# Patient Record
Sex: MALE | Race: WHITE | Employment: OTHER | URBAN - NONMETROPOLITAN AREA
[De-identification: names, ages, dates, MRNs, and addresses within clinical notes are randomized per-mention and may not be internally consistent; named-entity substitution may affect disease eponyms.]

---

## 2023-03-12 ENCOUNTER — APPOINTMENT (OUTPATIENT)
Dept: GENERAL RADIOLOGY | Age: 73
End: 2023-03-12
Payer: MEDICARE

## 2023-03-12 ENCOUNTER — APPOINTMENT (OUTPATIENT)
Dept: CT IMAGING | Age: 73
End: 2023-03-12
Payer: MEDICARE

## 2023-03-12 ENCOUNTER — HOSPITAL ENCOUNTER (INPATIENT)
Age: 73
LOS: 5 days | Discharge: HOME | End: 2023-03-17
Attending: EMERGENCY MEDICINE | Admitting: ORTHOPAEDIC SURGERY
Payer: MEDICARE

## 2023-03-12 ENCOUNTER — ANESTHESIA (OUTPATIENT)
Dept: OPERATING ROOM | Age: 73
End: 2023-03-12
Payer: MEDICARE

## 2023-03-12 ENCOUNTER — ANESTHESIA EVENT (OUTPATIENT)
Dept: OPERATING ROOM | Age: 73
End: 2023-03-12
Payer: MEDICARE

## 2023-03-12 DIAGNOSIS — F10.929 ACUTE ALCOHOLIC INTOXICATION WITH COMPLICATION (HCC): ICD-10-CM

## 2023-03-12 DIAGNOSIS — S42.91XA CLOSED FRACTURE DISLOCATION OF RIGHT SHOULDER, INITIAL ENCOUNTER: Primary | ICD-10-CM

## 2023-03-12 LAB
ABO: NORMAL
ALBUMIN SERPL BCG-MCNC: 4.3 G/DL (ref 3.5–5.1)
ALP SERPL-CCNC: 85 U/L (ref 38–126)
ALT SERPL W/O P-5'-P-CCNC: 44 U/L (ref 11–66)
ANION GAP SERPL CALC-SCNC: 12 MEQ/L (ref 8–16)
ANTIBODY SCREEN: NORMAL
AST SERPL-CCNC: 40 U/L (ref 5–40)
BASOPHILS ABSOLUTE: 0 THOU/MM3 (ref 0–0.1)
BASOPHILS NFR BLD AUTO: 0.2 %
BILIRUB SERPL-MCNC: 0.3 MG/DL (ref 0.3–1.2)
BUN SERPL-MCNC: 17 MG/DL (ref 7–22)
CALCIUM SERPL-MCNC: 9.1 MG/DL (ref 8.5–10.5)
CHLORIDE SERPL-SCNC: 106 MEQ/L (ref 98–111)
CO2 SERPL-SCNC: 21 MEQ/L (ref 23–33)
CREAT SERPL-MCNC: 0.8 MG/DL (ref 0.4–1.2)
DEPRECATED RDW RBC AUTO: 44.5 FL (ref 35–45)
EKG ATRIAL RATE: 98 BPM
EKG P AXIS: 80 DEGREES
EKG P-R INTERVAL: 174 MS
EKG Q-T INTERVAL: 350 MS
EKG QRS DURATION: 84 MS
EKG QTC CALCULATION (BAZETT): 446 MS
EKG R AXIS: 23 DEGREES
EKG T AXIS: 69 DEGREES
EKG VENTRICULAR RATE: 98 BPM
EOSINOPHIL NFR BLD AUTO: 0.1 %
EOSINOPHILS ABSOLUTE: 0 THOU/MM3 (ref 0–0.4)
ERYTHROCYTE [DISTWIDTH] IN BLOOD BY AUTOMATED COUNT: 13.2 % (ref 11.5–14.5)
ETHANOL SERPL-MCNC: 0.12 %
GFR SERPL CREATININE-BSD FRML MDRD: > 60 ML/MIN/1.73M2
GLUCOSE SERPL-MCNC: 125 MG/DL (ref 70–108)
HCT VFR BLD AUTO: 47 % (ref 42–52)
HGB BLD-MCNC: 15.8 GM/DL (ref 14–18)
IMM GRANULOCYTES # BLD AUTO: 0.1 THOU/MM3 (ref 0–0.07)
IMM GRANULOCYTES NFR BLD AUTO: 0.8 %
LYMPHOCYTES ABSOLUTE: 1 THOU/MM3 (ref 1–4.8)
LYMPHOCYTES NFR BLD AUTO: 8.2 %
MCH RBC QN AUTO: 31.2 PG (ref 26–33)
MCHC RBC AUTO-ENTMCNC: 33.6 GM/DL (ref 32.2–35.5)
MCV RBC AUTO: 92.9 FL (ref 80–94)
MONOCYTES ABSOLUTE: 0.7 THOU/MM3 (ref 0.4–1.3)
MONOCYTES NFR BLD AUTO: 5.4 %
NEUTROPHILS NFR BLD AUTO: 85.3 %
NRBC BLD AUTO-RTO: 0 /100 WBC
OSMOLALITY SERPL CALC.SUM OF ELEC: 280.6 MOSMOL/KG (ref 275–300)
PLATELET # BLD AUTO: 219 THOU/MM3 (ref 130–400)
PMV BLD AUTO: 8.5 FL (ref 9.4–12.4)
POTASSIUM SERPL-SCNC: 4.7 MEQ/L (ref 3.5–5.2)
PROT SERPL-MCNC: 7 G/DL (ref 6.1–8)
RBC # BLD AUTO: 5.06 MILL/MM3 (ref 4.7–6.1)
RH FACTOR: NORMAL
SEGMENTED NEUTROPHILS ABSOLUTE COUNT: 10.4 THOU/MM3 (ref 1.8–7.7)
SODIUM SERPL-SCNC: 139 MEQ/L (ref 135–145)
WBC # BLD AUTO: 12.2 THOU/MM3 (ref 4.8–10.8)

## 2023-03-12 PROCEDURE — 73130 X-RAY EXAM OF HAND: CPT

## 2023-03-12 PROCEDURE — 93010 ELECTROCARDIOGRAM REPORT: CPT | Performed by: INTERNAL MEDICINE

## 2023-03-12 PROCEDURE — 70450 CT HEAD/BRAIN W/O DYE: CPT

## 2023-03-12 PROCEDURE — 73200 CT UPPER EXTREMITY W/O DYE: CPT

## 2023-03-12 PROCEDURE — 99285 EMERGENCY DEPT VISIT HI MDM: CPT

## 2023-03-12 PROCEDURE — 82077 ASSAY SPEC XCP UR&BREATH IA: CPT

## 2023-03-12 PROCEDURE — 73020 X-RAY EXAM OF SHOULDER: CPT

## 2023-03-12 PROCEDURE — 6360000002 HC RX W HCPCS: Performed by: STUDENT IN AN ORGANIZED HEALTH CARE EDUCATION/TRAINING PROGRAM

## 2023-03-12 PROCEDURE — 2580000003 HC RX 258: Performed by: ANESTHESIOLOGY

## 2023-03-12 PROCEDURE — 99222 1ST HOSP IP/OBS MODERATE 55: CPT | Performed by: STUDENT IN AN ORGANIZED HEALTH CARE EDUCATION/TRAINING PROGRAM

## 2023-03-12 PROCEDURE — 36415 COLL VENOUS BLD VENIPUNCTURE: CPT

## 2023-03-12 PROCEDURE — 96376 TX/PRO/DX INJ SAME DRUG ADON: CPT

## 2023-03-12 PROCEDURE — 93005 ELECTROCARDIOGRAM TRACING: CPT | Performed by: STUDENT IN AN ORGANIZED HEALTH CARE EDUCATION/TRAINING PROGRAM

## 2023-03-12 PROCEDURE — 2500000003 HC RX 250 WO HCPCS

## 2023-03-12 PROCEDURE — 86900 BLOOD TYPING SEROLOGIC ABO: CPT

## 2023-03-12 PROCEDURE — 3600000002 HC SURGERY LEVEL 2 BASE: Performed by: ORTHOPAEDIC SURGERY

## 2023-03-12 PROCEDURE — 72125 CT NECK SPINE W/O DYE: CPT

## 2023-03-12 PROCEDURE — 96374 THER/PROPH/DIAG INJ IV PUSH: CPT

## 2023-03-12 PROCEDURE — 73030 X-RAY EXAM OF SHOULDER: CPT

## 2023-03-12 PROCEDURE — 94761 N-INVAS EAR/PLS OXIMETRY MLT: CPT

## 2023-03-12 PROCEDURE — 3600000012 HC SURGERY LEVEL 2 ADDTL 15MIN: Performed by: ORTHOPAEDIC SURGERY

## 2023-03-12 PROCEDURE — 6360000002 HC RX W HCPCS: Performed by: ANESTHESIOLOGY

## 2023-03-12 PROCEDURE — 2700000000 HC OXYGEN THERAPY PER DAY

## 2023-03-12 PROCEDURE — 73060 X-RAY EXAM OF HUMERUS: CPT

## 2023-03-12 PROCEDURE — 86901 BLOOD TYPING SEROLOGIC RH(D): CPT

## 2023-03-12 PROCEDURE — 6360000002 HC RX W HCPCS: Performed by: EMERGENCY MEDICINE

## 2023-03-12 PROCEDURE — 2500000003 HC RX 250 WO HCPCS: Performed by: NURSE ANESTHETIST, CERTIFIED REGISTERED

## 2023-03-12 PROCEDURE — 0PSCXZZ REPOSITION RIGHT HUMERAL HEAD, EXTERNAL APPROACH: ICD-10-PCS | Performed by: ORTHOPAEDIC SURGERY

## 2023-03-12 PROCEDURE — 3700000001 HC ADD 15 MINUTES (ANESTHESIA): Performed by: ORTHOPAEDIC SURGERY

## 2023-03-12 PROCEDURE — 1200000000 HC SEMI PRIVATE

## 2023-03-12 PROCEDURE — 6820000001 HC L2 TRAUMA SURGERY EVALUATION: Performed by: ORTHOPAEDIC SURGERY

## 2023-03-12 PROCEDURE — 85025 COMPLETE CBC W/AUTO DIFF WBC: CPT

## 2023-03-12 PROCEDURE — 3700000000 HC ANESTHESIA ATTENDED CARE: Performed by: ORTHOPAEDIC SURGERY

## 2023-03-12 PROCEDURE — 2580000003 HC RX 258: Performed by: EMERGENCY MEDICINE

## 2023-03-12 PROCEDURE — 80053 COMPREHEN METABOLIC PANEL: CPT

## 2023-03-12 PROCEDURE — 2580000003 HC RX 258: Performed by: PHYSICIAN ASSISTANT

## 2023-03-12 PROCEDURE — 6360000002 HC RX W HCPCS: Performed by: PHYSICIAN ASSISTANT

## 2023-03-12 PROCEDURE — 6370000000 HC RX 637 (ALT 250 FOR IP): Performed by: PHYSICIAN ASSISTANT

## 2023-03-12 PROCEDURE — 73070 X-RAY EXAM OF ELBOW: CPT

## 2023-03-12 PROCEDURE — 2709999900 HC NON-CHARGEABLE SUPPLY: Performed by: ORTHOPAEDIC SURGERY

## 2023-03-12 PROCEDURE — 86850 RBC ANTIBODY SCREEN: CPT

## 2023-03-12 RX ORDER — KETAMINE HCL IN NACL, ISO-OSM 100MG/10ML
1 SYRINGE (ML) INJECTION ONCE
Status: COMPLETED | OUTPATIENT
Start: 2023-03-12 | End: 2023-03-12

## 2023-03-12 RX ORDER — ONDANSETRON 2 MG/ML
4 INJECTION INTRAMUSCULAR; INTRAVENOUS EVERY 6 HOURS PRN
Status: DISCONTINUED | OUTPATIENT
Start: 2023-03-12 | End: 2023-03-17 | Stop reason: HOSPADM

## 2023-03-12 RX ORDER — HYDROCODONE BITARTRATE AND ACETAMINOPHEN 5; 325 MG/1; MG/1
1 TABLET ORAL EVERY 4 HOURS PRN
Status: DISCONTINUED | OUTPATIENT
Start: 2023-03-12 | End: 2023-03-17 | Stop reason: HOSPADM

## 2023-03-12 RX ORDER — CYCLOBENZAPRINE HCL 10 MG
10 TABLET ORAL 3 TIMES DAILY PRN
Status: DISCONTINUED | OUTPATIENT
Start: 2023-03-12 | End: 2023-03-17 | Stop reason: HOSPADM

## 2023-03-12 RX ORDER — HYDRALAZINE HYDROCHLORIDE 20 MG/ML
10 INJECTION INTRAMUSCULAR; INTRAVENOUS
Status: DISCONTINUED | OUTPATIENT
Start: 2023-03-12 | End: 2023-03-12 | Stop reason: HOSPADM

## 2023-03-12 RX ORDER — FENTANYL CITRATE 50 UG/ML
100 INJECTION, SOLUTION INTRAMUSCULAR; INTRAVENOUS ONCE
Status: COMPLETED | OUTPATIENT
Start: 2023-03-12 | End: 2023-03-12

## 2023-03-12 RX ORDER — KETAMINE HCL IN NACL, ISO-OSM 100MG/10ML
SYRINGE (ML) INJECTION
Status: COMPLETED
Start: 2023-03-12 | End: 2023-03-12

## 2023-03-12 RX ORDER — SODIUM CHLORIDE 9 MG/ML
INJECTION, SOLUTION INTRAVENOUS PRN
Status: DISCONTINUED | OUTPATIENT
Start: 2023-03-12 | End: 2023-03-12 | Stop reason: SDUPTHER

## 2023-03-12 RX ORDER — SODIUM CHLORIDE 0.9 % (FLUSH) 0.9 %
5-40 SYRINGE (ML) INJECTION PRN
Status: DISCONTINUED | OUTPATIENT
Start: 2023-03-12 | End: 2023-03-17 | Stop reason: HOSPADM

## 2023-03-12 RX ORDER — LIDOCAINE HYDROCHLORIDE 20 MG/ML
INJECTION, SOLUTION INFILTRATION; PERINEURAL PRN
Status: DISCONTINUED | OUTPATIENT
Start: 2023-03-12 | End: 2023-03-12 | Stop reason: SDUPTHER

## 2023-03-12 RX ORDER — SODIUM CHLORIDE 0.9 % (FLUSH) 0.9 %
5-40 SYRINGE (ML) INJECTION PRN
Status: DISCONTINUED | OUTPATIENT
Start: 2023-03-12 | End: 2023-03-12 | Stop reason: SDUPTHER

## 2023-03-12 RX ORDER — HYDROCODONE BITARTRATE AND ACETAMINOPHEN 5; 325 MG/1; MG/1
1 TABLET ORAL EVERY 6 HOURS PRN
Qty: 28 TABLET | Refills: 0 | Status: SHIPPED | OUTPATIENT
Start: 2023-03-12 | End: 2023-03-19

## 2023-03-12 RX ORDER — SODIUM CHLORIDE, SODIUM LACTATE, POTASSIUM CHLORIDE, AND CALCIUM CHLORIDE .6; .31; .03; .02 G/100ML; G/100ML; G/100ML; G/100ML
1000 INJECTION, SOLUTION INTRAVENOUS ONCE
Status: COMPLETED | OUTPATIENT
Start: 2023-03-12 | End: 2023-03-12

## 2023-03-12 RX ORDER — FENTANYL CITRATE 50 UG/ML
50 INJECTION, SOLUTION INTRAMUSCULAR; INTRAVENOUS EVERY 5 MIN PRN
Status: DISCONTINUED | OUTPATIENT
Start: 2023-03-12 | End: 2023-03-12 | Stop reason: HOSPADM

## 2023-03-12 RX ORDER — MORPHINE SULFATE 4 MG/ML
4 INJECTION, SOLUTION INTRAMUSCULAR; INTRAVENOUS
Status: DISCONTINUED | OUTPATIENT
Start: 2023-03-12 | End: 2023-03-17 | Stop reason: HOSPADM

## 2023-03-12 RX ORDER — ONDANSETRON 4 MG/1
4 TABLET, ORALLY DISINTEGRATING ORAL EVERY 8 HOURS PRN
Status: DISCONTINUED | OUTPATIENT
Start: 2023-03-12 | End: 2023-03-17 | Stop reason: HOSPADM

## 2023-03-12 RX ORDER — ACETAMINOPHEN 325 MG/1
650 TABLET ORAL EVERY 4 HOURS PRN
Status: DISCONTINUED | OUTPATIENT
Start: 2023-03-12 | End: 2023-03-17 | Stop reason: HOSPADM

## 2023-03-12 RX ORDER — FENTANYL CITRATE 50 UG/ML
50 INJECTION, SOLUTION INTRAMUSCULAR; INTRAVENOUS ONCE
Status: COMPLETED | OUTPATIENT
Start: 2023-03-12 | End: 2023-03-12

## 2023-03-12 RX ORDER — LABETALOL HYDROCHLORIDE 5 MG/ML
10 INJECTION, SOLUTION INTRAVENOUS
Status: DISCONTINUED | OUTPATIENT
Start: 2023-03-12 | End: 2023-03-12 | Stop reason: HOSPADM

## 2023-03-12 RX ORDER — FENTANYL CITRATE 50 UG/ML
INJECTION, SOLUTION INTRAMUSCULAR; INTRAVENOUS PRN
Status: DISCONTINUED | OUTPATIENT
Start: 2023-03-12 | End: 2023-03-12 | Stop reason: SDUPTHER

## 2023-03-12 RX ORDER — SODIUM CHLORIDE 0.9 % (FLUSH) 0.9 %
5-40 SYRINGE (ML) INJECTION EVERY 12 HOURS SCHEDULED
Status: DISCONTINUED | OUTPATIENT
Start: 2023-03-12 | End: 2023-03-17 | Stop reason: HOSPADM

## 2023-03-12 RX ORDER — MORPHINE SULFATE 2 MG/ML
2 INJECTION, SOLUTION INTRAMUSCULAR; INTRAVENOUS
Status: DISCONTINUED | OUTPATIENT
Start: 2023-03-12 | End: 2023-03-17 | Stop reason: HOSPADM

## 2023-03-12 RX ORDER — SODIUM CHLORIDE 0.9 % (FLUSH) 0.9 %
5-40 SYRINGE (ML) INJECTION EVERY 12 HOURS SCHEDULED
Status: DISCONTINUED | OUTPATIENT
Start: 2023-03-12 | End: 2023-03-12 | Stop reason: SDUPTHER

## 2023-03-12 RX ORDER — PROPOFOL 10 MG/ML
INJECTION, EMULSION INTRAVENOUS PRN
Status: DISCONTINUED | OUTPATIENT
Start: 2023-03-12 | End: 2023-03-12 | Stop reason: SDUPTHER

## 2023-03-12 RX ORDER — SODIUM CHLORIDE 9 MG/ML
INJECTION, SOLUTION INTRAVENOUS PRN
Status: DISCONTINUED | OUTPATIENT
Start: 2023-03-12 | End: 2023-03-17 | Stop reason: HOSPADM

## 2023-03-12 RX ORDER — HYDROCODONE BITARTRATE AND ACETAMINOPHEN 5; 325 MG/1; MG/1
2 TABLET ORAL EVERY 4 HOURS PRN
Status: DISCONTINUED | OUTPATIENT
Start: 2023-03-12 | End: 2023-03-17 | Stop reason: HOSPADM

## 2023-03-12 RX ORDER — SODIUM CHLORIDE, SODIUM LACTATE, POTASSIUM CHLORIDE, CALCIUM CHLORIDE 600; 310; 30; 20 MG/100ML; MG/100ML; MG/100ML; MG/100ML
INJECTION, SOLUTION INTRAVENOUS CONTINUOUS PRN
Status: DISCONTINUED | OUTPATIENT
Start: 2023-03-12 | End: 2023-03-12 | Stop reason: SDUPTHER

## 2023-03-12 RX ADMIN — FENTANYL CITRATE 50 MCG: 50 INJECTION, SOLUTION INTRAMUSCULAR; INTRAVENOUS at 08:02

## 2023-03-12 RX ADMIN — HYDROMORPHONE HYDROCHLORIDE 1 MG: 1 INJECTION, SOLUTION INTRAMUSCULAR; INTRAVENOUS; SUBCUTANEOUS at 12:36

## 2023-03-12 RX ADMIN — HYDROCODONE BITARTRATE AND ACETAMINOPHEN 2 TABLET: 5; 325 TABLET ORAL at 21:57

## 2023-03-12 RX ADMIN — FENTANYL CITRATE 100 MCG: 50 INJECTION, SOLUTION INTRAMUSCULAR; INTRAVENOUS at 10:46

## 2023-03-12 RX ADMIN — FENTANYL CITRATE 50 MCG: 50 INJECTION, SOLUTION INTRAMUSCULAR; INTRAVENOUS at 19:53

## 2023-03-12 RX ADMIN — PROPOFOL 250 MG: 10 INJECTION, EMULSION INTRAVENOUS at 19:53

## 2023-03-12 RX ADMIN — SODIUM CHLORIDE, POTASSIUM CHLORIDE, SODIUM LACTATE AND CALCIUM CHLORIDE 1000 ML: 600; 310; 30; 20 INJECTION, SOLUTION INTRAVENOUS at 08:44

## 2023-03-12 RX ADMIN — Medication 100 MG: at 10:19

## 2023-03-12 RX ADMIN — LIDOCAINE HYDROCHLORIDE 100 MG: 20 INJECTION, SOLUTION INFILTRATION; PERINEURAL at 19:53

## 2023-03-12 RX ADMIN — Medication 50 MG: at 10:24

## 2023-03-12 RX ADMIN — MORPHINE SULFATE 4 MG: 4 INJECTION, SOLUTION INTRAMUSCULAR; INTRAVENOUS at 16:06

## 2023-03-12 RX ADMIN — MORPHINE SULFATE 4 MG: 4 INJECTION, SOLUTION INTRAMUSCULAR; INTRAVENOUS at 13:53

## 2023-03-12 RX ADMIN — SODIUM CHLORIDE, PRESERVATIVE FREE 10 ML: 5 INJECTION INTRAVENOUS at 21:56

## 2023-03-12 RX ADMIN — FENTANYL CITRATE 50 MCG: 50 INJECTION, SOLUTION INTRAMUSCULAR; INTRAVENOUS at 19:56

## 2023-03-12 RX ADMIN — FENTANYL CITRATE 50 MCG: 50 INJECTION, SOLUTION INTRAMUSCULAR; INTRAVENOUS at 08:45

## 2023-03-12 RX ADMIN — SODIUM CHLORIDE, POTASSIUM CHLORIDE, SODIUM LACTATE AND CALCIUM CHLORIDE: 600; 310; 30; 20 INJECTION, SOLUTION INTRAVENOUS at 19:32

## 2023-03-12 RX ADMIN — MORPHINE SULFATE 4 MG: 4 INJECTION, SOLUTION INTRAMUSCULAR; INTRAVENOUS at 18:29

## 2023-03-12 ASSESSMENT — PAIN SCALES - GENERAL
PAINLEVEL_OUTOF10: 10
PAINLEVEL_OUTOF10: 7
PAINLEVEL_OUTOF10: 10
PAINLEVEL_OUTOF10: 4
PAINLEVEL_OUTOF10: 5
PAINLEVEL_OUTOF10: 10
PAINLEVEL_OUTOF10: 4
PAINLEVEL_OUTOF10: 4

## 2023-03-12 ASSESSMENT — PAIN DESCRIPTION - DESCRIPTORS
DESCRIPTORS: ACHING
DESCRIPTORS: SHARP
DESCRIPTORS: ACHING

## 2023-03-12 ASSESSMENT — PAIN DESCRIPTION - PAIN TYPE
TYPE: ACUTE PAIN

## 2023-03-12 ASSESSMENT — PAIN DESCRIPTION - LOCATION
LOCATION: SHOULDER

## 2023-03-12 ASSESSMENT — ENCOUNTER SYMPTOMS
DIARRHEA: 0
NAUSEA: 0
SINUS PRESSURE: 0
EYE PAIN: 0
EYE DISCHARGE: 0
ABDOMINAL DISTENTION: 0
RHINORRHEA: 0
CONSTIPATION: 0
ABDOMINAL PAIN: 0
SINUS PAIN: 0
SHORTNESS OF BREATH: 0
EYE ITCHING: 0
CHEST TIGHTNESS: 0
EYE REDNESS: 0
VOMITING: 0
COLOR CHANGE: 0

## 2023-03-12 ASSESSMENT — PAIN - FUNCTIONAL ASSESSMENT
PAIN_FUNCTIONAL_ASSESSMENT: 0-10
PAIN_FUNCTIONAL_ASSESSMENT: PREVENTS OR INTERFERES SOME ACTIVE ACTIVITIES AND ADLS
PAIN_FUNCTIONAL_ASSESSMENT: PREVENTS OR INTERFERES WITH MANY ACTIVE NOT PASSIVE ACTIVITIES

## 2023-03-12 ASSESSMENT — LIFESTYLE VARIABLES: SMOKING_STATUS: 1

## 2023-03-12 ASSESSMENT — PAIN DESCRIPTION - ORIENTATION
ORIENTATION: RIGHT

## 2023-03-12 NOTE — SEDATION DOCUMENTATION
Patient appears to be adequately sedated, Dr. Peg Woody and Dr. Umm Sanchez attempting reduction at this time. Respiratory at bedside.
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
## 2023-03-12 NOTE — ANESTHESIA PRE PROCEDURE
Department of Anesthesiology  Preprocedure Note       Name:  Saleem Anderson   Age:  72 y.o.  :  1950                                          MRN:  [id number]         Date:  3/12/2023      Surgeon: Surgeon(s):  Lowell Kelly MD    Procedure: Procedure(s):  RIGHT CLAVICLE OPEN REDUCTION INTERNAL FIXATION VS CLOSED SHOULDER REDUCTION    Medications prior to admission:   Prior to Admission medications    Not on File       Current medications:    Current Facility-Administered Medications   Medication Dose Route Frequency Provider Last Rate Last Admin   • sodium chloride flush 0.9 % injection 5-40 mL  5-40 mL IntraVENous 2 times per day ROBERT Pastrana       • sodium chloride flush 0.9 % injection 5-40 mL  5-40 mL IntraVENous PRN ROBERT Pastrana       • 0.9 % sodium chloride infusion   IntraVENous PRN ROBERT Pastrana       • ondansetron (ZOFRAN-ODT) disintegrating tablet 4 mg  4 mg Oral Q8H PRN ROBERT Pastrana        Or   • ondansetron (ZOFRAN) injection 4 mg  4 mg IntraVENous Q6H PRN ROBERT Pastrana       • morphine (PF) injection 2 mg  2 mg IntraVENous Q2H PRN ROBERT Pastrana        Or   • morphine injection 4 mg  4 mg IntraVENous Q2H PRN ROBERT Pastrana   4 mg at 23 182       Allergies:  No Known Allergies    Problem List:    Patient Active Problem List   Diagnosis Code   • Closed fracture dislocation of right shoulder, initial encounter S42.91XA   • Traumatic closed displaced fracture of right shoulder with anterior dislocation, initial encounter S42.91XA       Past Medical History:  History reviewed. No pertinent past medical history.    Past Surgical History:  History reviewed. No pertinent surgical history.    Social History:    Social History     Tobacco Use   • Smoking status: Every Day     Packs/day: 1.00     Types: Cigarettes   • Smokeless tobacco: Not on file   Substance Use Topics   • Alcohol use: Yes     Alcohol/week: 12.0 standard drinks     Types: 12 Cans of beer per week      Comment: once every two weeks                                Ready to quit: Not Answered  Counseling given: Not Answered      Vital Signs (Current):   Vitals:    03/12/23 1104 03/12/23 1323 03/12/23 1555 03/12/23 1606   BP: (!) 143/85 (!) 167/93 (!) 145/70    Pulse: 99 (!) 102 96    Resp: 21 20 20 22   Temp:  98.2 °F (36.8 °C) 97.8 °F (36.6 °C)    TempSrc:  Axillary Oral    SpO2: 98% 98% 95%    Weight:       Height:                                                  BP Readings from Last 3 Encounters:   03/12/23 (!) 145/70       NPO Status: Time of last liquid consumption: 2330                        Time of last solid consumption: 2330                        Date of last liquid consumption: 03/11/23                        Date of last solid food consumption: 03/11/23    BMI:   Wt Readings from Last 3 Encounters:   03/12/23 195 lb (88.5 kg)     Body mass index is 25.73 kg/m². CBC:   Lab Results   Component Value Date/Time    WBC 12.2 03/12/2023 08:54 AM    RBC 5.06 03/12/2023 08:54 AM    HGB 15.8 03/12/2023 08:54 AM    HCT 47.0 03/12/2023 08:54 AM    MCV 92.9 03/12/2023 08:54 AM     03/12/2023 08:54 AM       CMP:   Lab Results   Component Value Date/Time     03/12/2023 08:54 AM    K 4.7 03/12/2023 08:54 AM     03/12/2023 08:54 AM    CO2 21 03/12/2023 08:54 AM    BUN 17 03/12/2023 08:54 AM    CREATININE 0.8 03/12/2023 08:54 AM    LABGLOM >60 03/12/2023 08:54 AM    GLUCOSE 125 03/12/2023 08:54 AM    PROT 7.0 03/12/2023 08:54 AM    CALCIUM 9.1 03/12/2023 08:54 AM    BILITOT 0.3 03/12/2023 08:54 AM    ALKPHOS 85 03/12/2023 08:54 AM    AST 40 03/12/2023 08:54 AM    ALT 44 03/12/2023 08:54 AM       POC Tests: No results for input(s): POCGLU, POCNA, POCK, POCCL, POCBUN, POCHEMO, POCHCT in the last 72 hours.     Coags: No results found for: PROTIME, INR, APTT    HCG (If Applicable): No results found for: PREGTESTUR, PREGSERUM, HCG, HCGQUANT     ABGs: No results found for: PHART, PO2ART, VBJ2MUU, EMV2JSL, BEART, X5XFNUET     Type & Screen (If Applicable):  Lab Results   Component Value Date    LABRH POS 03/12/2023       Drug/Infectious Status (If Applicable):  No results found for: HIV, HEPCAB    COVID-19 Screening (If Applicable): No results found for: COVID19        Anesthesia Evaluation  Patient summary reviewed  Airway: Mallampati: II  TM distance: >3 FB   Neck ROM: full  Mouth opening: > = 3 FB   Dental:    (+) poor dentition      Pulmonary:   (+) current smoker          Patient did not smoke on day of surgery. Cardiovascular:          ECG reviewed                        Neuro/Psych:               GI/Hepatic/Renal:             Endo/Other:                     Abdominal:             Vascular: Other Findings:           Anesthesia Plan      general     ASA 2       Induction: intravenous. MIPS: Postoperative opioids intended and Prophylactic antiemetics administered. Anesthetic plan and risks discussed with patient. Plan discussed with LARRY. Shavon Colmenares.  [address] [place] [address]   3/12/2023

## 2023-03-12 NOTE — PROGRESS NOTES
Respiratory on stand by for conscience sedation. Pt was placed on nc at 3lpm and end tidal co2 monitor reading of 16.

## 2023-03-12 NOTE — SEDATION DOCUMENTATION
Shoulder reduced at this time by Dr. Mariangel Reagan. well developed, well nourished , in no acute distress , ambulating without difficulty , normal communication ability

## 2023-03-12 NOTE — ED PROVIDER NOTES
[facility]  EMERGENCY DEPARTMENT ENCOUNTER          Pt Name: Saleem Anderson  MRN: [id number]  Birthdate 1950  Date of evaluation: 3/12/2023  Emergency Physician: Manuel Riley DO  Supervising Physician: Dr. Duncan    CHIEF COMPLAINT       Chief Complaint   Patient presents with    Shoulder Pain     Right     History obtained from the patient.      HISTORY OF PRESENT ILLNESS    HPI  Saleem Anderson is a 72 y.o. male who presents to the emergency department for evaluation of shoulder pain.  The patient presented to the emergency department by EMS.  He states that he is a binge drinker and since 11 AM yesterday he was drinking continuously with his friends.  He remembers his friends taking him home, and then states that he woke up this morning screaming in pain from his right shoulder.  His roommates called 911.  The patient does not remember falling or injuring himself.  He denies chest pain, shortness of breath, and reports that he was previously healthy.  He reports smoking a pack of cigarettes per day, binge drinking, and denies other recreational drug use.    The patient has no other acute complaints at this time.      REVIEW OF SYSTEMS   Review of Systems   Constitutional:  Negative for fever.   HENT:  Negative for ear pain, rhinorrhea, sinus pressure and sinus pain.    Eyes:  Negative for pain, discharge, redness and itching.   Respiratory:  Negative for chest tightness and shortness of breath.    Cardiovascular:  Negative for chest pain and palpitations.   Gastrointestinal:  Negative for abdominal distention, abdominal pain, constipation, diarrhea, nausea and vomiting.   Genitourinary:  Negative for dysuria, frequency, hematuria and urgency.   Musculoskeletal:  Positive for arthralgias. Negative for neck pain.   Skin:  Negative for color change.   Neurological:  Negative for dizziness, syncope and light-headedness.       PAST MEDICAL AND SURGICAL HISTORY   History reviewed. No  pertinent past medical history. History reviewed. No pertinent surgical history. MEDICATIONS     Current Facility-Administered Medications:     sodium chloride flush 0.9 % injection 5-40 mL, 5-40 mL, IntraVENous, 2 times per day, ROBERT Hoskins    sodium chloride flush 0.9 % injection 5-40 mL, 5-40 mL, IntraVENous, PRN, ROBERT Chahal    0.9 % sodium chloride infusion, , IntraVENous, PRN, ROBERT Hoskins    ondansetron (ZOFRAN-ODT) disintegrating tablet 4 mg, 4 mg, Oral, Q8H PRN **OR** ondansetron (ZOFRAN) injection 4 mg, 4 mg, IntraVENous, Q6H PRN, ROBERT Hoskins    morphine (PF) injection 2 mg, 2 mg, IntraVENous, Q2H PRN **OR** morphine injection 4 mg, 4 mg, IntraVENous, Q2H PRN, ROBERT Hoskins, 4 mg at 03/12/23 1606      SOCIAL HISTORY     Social History     Social History Narrative    Not on file     Social History     Tobacco Use    Smoking status: Every Day     Packs/day: 1.00     Types: Cigarettes   Substance Use Topics    Alcohol use: Yes     Alcohol/week: 12.0 standard drinks     Types: 12 Cans of beer per week     Comment: once every two weeks    Drug use: Never         ALLERGIES   No Known Allergies      FAMILY HISTORY   History reviewed. No pertinent family history. PHYSICAL EXAM     ED Triage Vitals [03/12/23 0722]   BP Temp Temp Source Heart Rate Resp SpO2 Height Weight   (!) 139/93 98.2 °F (36.8 °C) Axillary (!) 105 16 96 % 6' 1\" (1.854 m) 195 lb (88.5 kg)         Additional Vital Signs:  Vitals:    03/12/23 1606   BP:    Pulse:    Resp: 22   Temp:    SpO2:        Physical Exam  Constitutional:       General: He is not in acute distress. Appearance: Normal appearance. He is ill-appearing. Comments: The patient appears chronically ill and disheveled. HENT:      Head: Normocephalic. Comments: Faint abrasion over the frontal scalp near the glabella. Nose: Nose normal. No congestion or rhinorrhea.       Mouth/Throat:      Mouth: Mucous membranes are moist. Pharynx: Oropharynx is clear. No oropharyngeal exudate or posterior oropharyngeal erythema. Comments: Poor dentition. Eyes:      Extraocular Movements: Extraocular movements intact. Pupils: Pupils are equal, round, and reactive to light. Cardiovascular:      Rate and Rhythm: Normal rate and regular rhythm. Pulses: Normal pulses. Heart sounds: Normal heart sounds. Pulmonary:      Effort: Pulmonary effort is normal. No respiratory distress. Breath sounds: Normal breath sounds. Abdominal:      General: Abdomen is flat. There is no distension. Palpations: Abdomen is soft. Tenderness: There is no abdominal tenderness. Musculoskeletal:         General: Swelling and tenderness present. Normal range of motion. Cervical back: Normal range of motion. No rigidity or tenderness. Right lower leg: No edema. Left lower leg: No edema. Comments: Right shoulder tenderness and swelling. Right upper arm tenderness and swelling. Abrasion over the posterior right elbow. No tenderness and normal range of motion to the right elbow. Right hand scaphoid tenderness. Skin:     General: Skin is warm and dry. Capillary Refill: Capillary refill takes less than 2 seconds. Neurological:      General: No focal deficit present. Mental Status: He is alert and oriented to person, place, and time. Mental status is at baseline. INITIAL IMPRESSION, DIFFERENTIAL DIAGNOSIS, AND PLAN   Initial Assessment: Given the patient's above chief complaint and findings on history and physical examination, I thought it was appropriate to consider the following emergency medical conditions: ICH, cervical spine fracture shoulder dislocation, shoulder fracture, humerus fracture, elbow fracture, hand fracture, dehydration, alcohol intoxication, anemia, electrolyte abnormality, cardiac ischemia.   Although some of these diagnoses are unlikely they were considered in my medical decision making. Chronic Conditions considered:   Patient Active Problem List   Diagnosis    Closed fracture dislocation of right shoulder, initial encounter    Traumatic closed displaced fracture of right shoulder with anterior dislocation, initial encounter       Diagnostic: IV, labs, EKG. CT head and cervical spine without contrast.  X-ray right shoulder, humerus, elbow, and hand. Therapeutic: Fentanyl. ED RESULTS   Laboratory results:  Labs Reviewed   CBC WITH AUTO DIFFERENTIAL - Abnormal; Notable for the following components:       Result Value    WBC 12.2 (*)     MPV 8.5 (*)     Segs Absolute 10.4 (*)     Immature Grans (Abs) 0.10 (*)     All other components within normal limits   COMPREHENSIVE METABOLIC PANEL - Abnormal; Notable for the following components:    Glucose 125 (*)     CO2 21 (*)     All other components within normal limits   ETHANOL   ANION GAP   GLOMERULAR FILTRATION RATE, ESTIMATED   OSMOLALITY   TYPE AND SCREEN       Radiologic studies results:  CT SHOULDER RIGHT WO CONTRAST   Final Result    Comminuted fracture dislocation of the right shoulder as described. **This report has been created using voice recognition software. It may contain minor errors which are inherent in voice recognition technology. **      Final report electronically signed by Dr. Siria Felder MD on 3/12/2023 12:38 PM      XR SHOULDER RIGHT 1 VW   Final Result    Persistent anterior fracture dislocation of the right shoulder. **This report has been created using voice recognition software. It may contain minor errors which are inherent in voice recognition technology. **      Final report electronically signed by Dr. Siria Felder MD on 3/12/2023 11:01 AM      CT HEAD WO CONTRAST   Final Result       1. No evidence of acute intracranial abnormality. 2. Periodontal disease. **This report has been created using voice recognition software.  It may contain minor errors which are inherent in voice recognition technology. **      Final report electronically signed by Dr. Author Norman MD on 3/12/2023 8:22 AM      CT CERVICAL SPINE WO CONTRAST   Final Result    No evidence of acute osseous injury of the cervical spine. **This report has been created using voice recognition software. It may contain minor errors which are inherent in voice recognition technology. **      Final report electronically signed by Dr. Author Norman MD on 3/12/2023 8:23 AM      XR ELBOW RIGHT (2 VIEWS)   Final Result    No visualized fracture of the right elbow. **This report has been created using voice recognition software. It may contain minor errors which are inherent in voice recognition technology. **      Final report electronically signed by Dr. Author Norman MD on 3/12/2023 8:29 AM      XR HUMERUS RIGHT (MIN 2 VIEWS)   Final Result    Fracture anterior dislocation of the right shoulder joint with prominently displaced fracture fragments from the humeral head. **This report has been created using voice recognition software. It may contain minor errors which are inherent in voice recognition technology. **      Final report electronically signed by Dr. Author Norman MD on 3/12/2023 8:28 AM      XR HAND RIGHT (MIN 3 VIEWS)   Final Result   No evidence of acute osseous injury of the right hand. **This report has been created using voice recognition software. It may contain minor errors which are inherent in voice recognition technology. **      Final report electronically signed by Dr. Author Norman MD on 3/12/2023 8:25 AM      XR SHOULDER RIGHT (MIN 2 VIEWS)   Final Result    Fracture anterior dislocation of the right shoulder joint with prominently displaced fracture fragments from the humeral head. **This report has been created using voice recognition software.  It may contain minor errors which are inherent in voice recognition technology. **      Final report electronically signed by Dr. Thomas Flynn MD on 3/12/2023 8:28 AM          ED Medications administered this visit:   Medications   sodium chloride flush 0.9 % injection 5-40 mL (has no administration in time range)   sodium chloride flush 0.9 % injection 5-40 mL (has no administration in time range)   0.9 % sodium chloride infusion (has no administration in time range)   ondansetron (ZOFRAN-ODT) disintegrating tablet 4 mg (has no administration in time range)     Or   ondansetron (ZOFRAN) injection 4 mg (has no administration in time range)   morphine (PF) injection 2 mg ( IntraVENous See Alternative 3/12/23 1606)     Or   morphine injection 4 mg (4 mg IntraVENous Given 3/12/23 1606)   fentaNYL (SUBLIMAZE) injection 50 mcg (50 mcg IntraVENous Given 3/12/23 0802)   lactated ringers bolus (0 mLs IntraVENous Stopped 3/12/23 1345)   fentaNYL (SUBLIMAZE) injection 50 mcg (50 mcg IntraVENous Given 3/12/23 0845)   ketamine (KETALAR) injection 88.5 mg (100 mg IntraVENous Given 3/12/23 1019)   ketamine (KETALAR) 50 MG/5ML injection (50 mg  Given 3/12/23 1024)   fentaNYL (SUBLIMAZE) injection 100 mcg (100 mcg IntraVENous Given 3/12/23 1046)   HYDROmorphone (DILAUDID) injection 1 mg (1 mg IntraVENous Given 3/12/23 1236)         [facility]     ED Course as of 03/12/23 1713   Sun Mar 12, 2023   0850 Case discussed with Quan Zazueta, on-call for orthopedic surgery. He is reportedly currently in surgery and stated that he would take a look at the x-rays and call us back. [DO]   0130 Received a call from Quan Zazueta at approximately this time. He reviewed the x-rays and recommended attempting to reduce it at bedside and if successful to place the patient in a sling and swath and discharged the patient home with next day follow-up at [facility]. [DO]   1046 Reduction attempted and unsuccessful.   During the reduction the shoulder joint was unstable and appeared to dislocate twice. Donaldo Jones updated, who accepted the patient for admission to the hospital. [DO]      ED Course User Index  [DO] Mario Reid DO     Available laboratory and imaging results were independently reviewed and clinically correlated. Decision Rules/Clinical Scores utilized: Aela Allen Code Status:  Not addressed during this ED visit    [facility] Social determinants of health considered to potentially effect treatment and/or disposition plan:    Healthy People 2030, U.[place] Department of Health and Human Services, Office of Disease Prevention and Health Promotion. Older age     Medical comorbidities impacting treatment or disposition:  Not Applicable. History reviewed. No pertinent past medical history. Consultants: Not Applicable. Final Assessment and Plan:   Per ED course      The diagnosis, extensive differential diagnosis, plan of care, laboratory, and imaging findings were discussed at the bedside. All questions and concerns were addressed at the time of the encounter. MEDICATION CHANGES     DISCHARGE MEDICATIONS:  There are no discharge medications for this patient. FINAL DISPOSITION     Final diagnoses:   Closed fracture dislocation of right shoulder, initial encounter   Acute alcoholic intoxication with complication (HCC)     Condition: condition: serious  Dispo: Admit to orthopedic surgery    PATIENT REFERRED TO:  No follow-up provider specified.     Critical Care Time   CRITICAL CARE:  None    PROCEDURES: (None if blank)  Procedural sedation    Date/Time: 3/12/2023 10:50 AM  Performed by: Mario Reid DO  Authorized by: Lady Monse MD     Consent:     Consent obtained:  Written    Consent given by:  Patient    Risks, benefits, and alternatives were discussed: yes      Risks discussed:  Respiratory compromise necessitating ventilatory assistance and intubation  Universal protocol:     Procedure explained and questions answered to patient or proxy's satisfaction: yes Imaging studies available: yes      Patient identity confirmed:  Verbally with patient, arm band and hospital-assigned identification number  Indications:     Procedure performed:  Fracture reduction    Procedure necessitating sedation performed by:  Physician performing sedation    Expected sedation level: Dissociative.   Pre-sedation assessment:     ASA classification: class 2 - patient with mild systemic disease      Mouth opening:  3 or more finger widths    Thyromental distance:  2 finger widths    Mallampati score:  II - soft palate, uvula, fauces visible    Neck mobility: normal      Pre-sedation assessments completed and reviewed: airway patency, anesthesia/sedation history, cardiovascular function, mental status, nausea/vomiting, pain level and respiratory function      History of difficult intubation: no    Immediate pre-procedure details:     Reassessment: Patient reassessed immediately prior to procedure      Reviewed: vital signs, relevant labs/tests and NPO status      Reviewed comment:  N.p.o. since 11 PM last night    Verified: bag valve mask available, emergency equipment available, intubation equipment available, IV patency confirmed, oxygen available and suction available    Procedure details (see MAR for exact dosages):     Sedation start time:  3/12/2023 10:20 AM    Preoxygenation:  Nasal cannula    Sedation:  Ketamine    Intra-procedure monitoring:  Blood pressure monitoring, continuous capnometry, frequent LOC assessments, frequent vital sign checks, continuous pulse oximetry and cardiac monitor    Intra-procedure events: none      Sedation end time:  3/12/2023 10:30 AM  Post-procedure details:     Attendance: Constant attendance by certified staff until patient recovered      Recovery: Patient returned to pre-procedure baseline      Post-sedation assessments completed and reviewed: airway patency, cardiovascular function, mental status, pain level and respiratory function      Patient is stable for discharge or admission: yes      Procedure completion:  Tolerated well, no immediate complications  Ortho Injury    Date/Time: 3/12/2023 10:54 AM  Performed by: Zurdo Blackman DO  Authorized by: Arden Cuellar MD   Consent: Written consent obtained. Consent given by: patient  Patient understanding: patient states understanding of the procedure being performed  Patient consent: the patient's understanding of the procedure matches consent given  Procedure consent: procedure consent matches procedure scheduled  Relevant documents: relevant documents present and verified  Imaging studies: imaging studies available  Required items: required blood products, implants, devices, and special equipment available  Time out: Immediately prior to procedure a \"time out\" was called to verify the correct patient, procedure, equipment, support staff and site/side marked as required. Injury location: shoulder  Location details: right shoulder  Injury type: fracture-dislocation  Dislocation type: anterior  Pre-procedure neurovascular assessment: neurovascularly intact  Pre-procedure distal perfusion: normal  Pre-procedure neurological function: normal  Pre-procedure range of motion: reduced    Sedation:  Patient sedated: yes    Manipulation performed: yes  Reduction successful: no  Immobilization: Shoulder immobilizer. Post-procedure neurovascular assessment: post-procedure neurovascularly intact  Post-procedure distal perfusion: normal  Post-procedure neurological function: normal  Post-procedure range of motion: normal  Patient tolerance: patient tolerated the procedure well with no immediate complications  Comments: Milch maneuver initially performed without success. Traction and countertraction was then performed. The patient's shoulder appeared to dislocate and relocate 2 times. Shoulder immobilizer applied. Repeat x-ray appears to confirm a persistent fracture dislocation.   We will plan to admit the patient to the orthopedic surgery service.    :   Medical Decision Making      This transcription was electronically signed. Parts of this transcriptions may have been dictated by use of voice recognition software and electronically transcribed, and parts may have been transcribed with the assistance of an ED scribe. The transcription may contain errors not detected in proofreading.     Electronically Signed: Ann Corado DO, 03/12/23, 5:13 PM        Ann Corado DO  Resident  03/12/23 4836

## 2023-03-12 NOTE — ED TRIAGE NOTES
Patient to room 24 via [facility] for complaint of right shoulder injury. Patient states that he is a binge drinker and was drinking last night. He remembers being picked up by family members, but doesn't remember anything past that point. Per report, his family found him lying in a doorway in pain and called 911. Deformity noted to the right shoulder. Dr. Mariangel Reagan at bedside to assess.

## 2023-03-12 NOTE — ED PROVIDER NOTES
[facility]  EMERGENCY MEDICINE ATTENDING ATTESTATION      Evaluation of Janee Mitchell. Case discussed and care plan developed with resident physician. I agree with the resident physician documentation and plan as documented by him, except if my documentation differs. Patient seen, interviewed and examined by me. I reviewed the medical, surgical, family and social history, medications and allergies. I have reviewed the nursing documentation. Brief H&P   Patient c/o waking up with right shoulder pain this morning without knowing what happened. Patient states he went on alcohol binge since 11 in the morning until about 11 at night last night when a relative brought him back home, then he woke up with the pain this morning without knowing exactly how he got injured. He does not remember falling. Physical exam is notable for well appearing, alcohol on breath. Partially edentulous. Dry mucous membranes. There is a bruise and deformity on the right shoulder. There is an old appearing abrasion on the forehead. Medical Decision Making   MDM:   Fall with unclear circumstances  Right shoulder fracture dislocation  Acute alcohol intoxication  Plan:   IV line, labs  IV fluids  Imaging  We will discuss case with orthopedics for reduction and surgical planning  Observation in the ED while awaiting results    Please see the resident physician completed note for final disposition except as documented on this attestation. I have reviewed and interpreted all available lab, radiology and ekg results available at the moment. Diagnosis, treatment and disposition plans were discussed and agreed upon by patient. This transcription was electronically signed. It was dictated by use of voice recognition software and electronically transcribed. The transcription may contain errors not detected in proofreading.      I performed direct supervision and was present for the critical portion following procedures:  Procedural sedation, dislocation reduction, see resident's note for procedure documentation. Right shoulder was reduced twice but continues sliding back out.   Critical care time on this case: None    Electronically signed by David Sandra MD on 3/12/23 at 8:17 AM EDT        David Sandra MD  03/12/23 0118

## 2023-03-12 NOTE — ED NOTES
ED to inpatient nurses report    Chief Complaint   Patient presents with    Shoulder Pain     Right      Present to ED from home  LOC: alert and orientated to name, place, date  Vital signs   Vitals:    03/12/23 1033 03/12/23 1048 03/12/23 1050 03/12/23 1104   BP: (!) 161/96 (!) 156/88 (!) 140/80 (!) 143/85   Pulse: (!) 112 100 98 99   Resp: 24 20 21 21   Temp:       TempSrc:       SpO2: 100% 100% 96% 98%   Weight:       Height:          Oxygen Baseline 96% room air    Current needs required room air Bipap/Cpap No  LDAs:   Peripheral IV 03/12/23 Left Forearm (Active)   Site Assessment Clean, dry & intact 03/12/23 1017   Line Status Flushed;Blood return noted;Normal saline locked 03/12/23 1017     Mobility: Requires assistance * 1  Pending ED orders: None  Present condition: Stable      C-SSRS Risk of Suicide: No Risk  Swallow Screening    Preferred Language: Bloggerce     Electronically signed by Kannan Bull RN on 3/12/2023 at 12:44 PM     Kannan Bull RN  03/12/23 9424

## 2023-03-12 NOTE — ED NOTES
Dr. Mayank Lott and Dr. Maribel Sandoval state they are going to attempt to reduce in department. Respiratory at bedside.       Shanda Hidalgo RN  03/12/23 4406

## 2023-03-12 NOTE — ED NOTES
Pt transported to [address] [place] [address] 7. Pt in stable condition. Floor contacted before transport, spoke to [facility].       Domingo Guadarrama  03/12/23 9862

## 2023-03-12 NOTE — H&P
History and Physical        CHIEF COMPLAINT: Right shoulder pain with deformity    HISTORY OF PRESENT ILLNESS:      The patient is a 71 y.o. male  who presents with acute onset of right shoulder pain with deformity with unknown mechanism of injury. Patient reports that he began drinking early in the day on 3/11/2023 and had drank heavily throughout the day. He denies any known trauma or injury but believes something had happened to the right shoulder sometime before midnight. He awoke the next day with severe pain in the right shoulder and noted deformity. Brought via EMS to [facility] emergency department. X-ray imaging demonstrated an anteriorly dislocated right shoulder with fracture involving right proximal humerus. Emergency room staff attempted reduction in the ED and was able to reduce the shoulder however it was rather unstable and would subsequently subluxate/dislocate repeatedly. Patient endorsing severe right shoulder pain. Denies any numbness or tingling. We were asked to evaluate. Past Medical History:    History reviewed. No pertinent past medical history. Past Surgical History:    History reviewed. No pertinent surgical history. Medications Prior to Admission:   Prior to Admission medications    Not on File    Scheduled Meds:   sodium chloride flush  5-40 mL IntraVENous 2 times per day     Continuous Infusions:   sodium chloride       PRN Meds:.sodium chloride flush, sodium chloride, ondansetron **OR** ondansetron, morphine **OR** morphine      Allergies:  Patient has no known allergies. Social History:   Social History     Socioeconomic History    Marital status:      Spouse name: None    Number of children: None    Years of education: None    Highest education level: None   Tobacco Use    Smoking status: Every Day     Packs/day: 1.00     Types: Cigarettes   Substance and Sexual Activity    Alcohol use:  Yes     Alcohol/week: 12.0 standard drinks Types: 12 Cans of beer per week     Comment: once every two weeks    Drug use: Never     Social History     Tobacco Use   Smoking Status Every Day    Packs/day: 1.00    Types: Cigarettes   Smokeless Tobacco Not on file     Social History     Substance and Sexual Activity   Alcohol Use Yes    Alcohol/week: 12.0 standard drinks    Types: 12 Cans of beer per week    Comment: once every two weeks     Social History     Substance and Sexual Activity   Drug Use Never       Family History:  History reviewed. No pertinent family history.       REVIEW OF SYSTEMS:  Gen: Negative for nausea, vomiting, diarrhea, fever, chills, night sweats  Heart: Negative for HTN, palpitations, chest pain  Lungs: Negative for wheezes, asthma or SOB  GI: Negative for nausea, vomiting  Endo: Negative for diabetes  Heme: Negative for DVT       PHYSICAL EXAM:  Patient Vitals for the past 24 hrs:   BP Temp Temp src Pulse Resp SpO2 Height Weight   03/12/23 1323 (!) 167/93 98.2 °F (36.8 °C) Axillary (!) 102 20 98 % -- --   03/12/23 1104 (!) 143/85 -- -- 99 21 98 % -- --   03/12/23 1050 (!) 140/80 -- -- 98 21 96 % -- --   03/12/23 1048 (!) 156/88 -- -- 100 20 100 % -- --   03/12/23 1033 (!) 161/96 -- -- (!) 112 24 100 % -- --   03/12/23 1030 -- -- -- (!) 129 22 98 % -- --   03/12/23 1028 (!) 202/143 -- -- (!) 118 -- -- -- --   03/12/23 1023 (!) 189/119 -- -- (!) 116 20 99 % -- --   03/12/23 1020 -- -- -- (!) 105 19 99 % -- --   03/12/23 1009 135/69 -- -- 100 18 98 % -- --   03/12/23 0854 132/88 -- -- 100 26 97 % -- --   03/12/23 0722 (!) 139/93 98.2 °F (36.8 °C) Axillary (!) 105 16 96 % 6' 1\" (1.854 m) 195 lb (88.5 kg)     Gen: alert and oriented x3  Head: normorcephalic, atraumatic; very poor dentition missing numerous teeth and possible caries   Neck: supple  Heart: RRR  Lungs: No audible wheezes  Abdomen: soft  Pelvis: stable  RUE: Noted deformity right shoulder with severe tenderness upon palpation anterior aspect right shoulder and with any attempt at range of motion of the right shoulder. Nontender when palpating more midshaft humerus distally and into the elbow, forearm, wrist, hand, fingers. Able to adequately move the wrist and fingers without difficulty. No focal neurodeficits noted. Radial and ulnar pulses strong and regular. Good capillary refill and sensation proximally to distally. DATA:  CBC:   Lab Results   Component Value Date/Time    WBC 12.2 03/12/2023 08:54 AM    HGB 15.8 03/12/2023 08:54 AM     03/12/2023 08:54 AM     BMP:    Lab Results   Component Value Date/Time     03/12/2023 08:54 AM    K 4.7 03/12/2023 08:54 AM     03/12/2023 08:54 AM    CO2 21 03/12/2023 08:54 AM    BUN 17 03/12/2023 08:54 AM    CREATININE 0.8 03/12/2023 08:54 AM    CALCIUM 9.1 03/12/2023 08:54 AM    GLUCOSE 125 03/12/2023 08:54 AM     PT/INR:  No results found for: PROTIME, INR  Troponin:  No results found for: [address] [facility]    Radiology: CT Right Shoulder: There is a comminuted fracture of the humeral head with large fracture fragment components OF the glenoid. The main component of the humeral head and contiguous humerus is dislocated anterior to the glenoid and just inferior to the coracoid    process. The glenoid appears intact. No other fracture is evident. ASSESSMENT:Principal Problem:    Closed fracture dislocation of right shoulder, initial encounter  Active Problems:    Traumatic closed displaced fracture of right shoulder with anterior dislocation, initial encounter  Resolved Problems:    * No resolved hospital problems. *       PLAN:  As discussed with Dr. Justin Forde, orthopedic attending surgeon, plan is to go ahead and admit the patient for definitive treatment and surgical management of the right shoulder fracture dislocation. We will get him medically optimized and cleared to proceed with surgery from a medical standpoint.   We most likely will attempt open reduction internal fixation of the right proximal humerus which should allow for us to adequately reduce the right shoulder dislocation. There is the off chance we could be able to reduce it without fixation but the fracture component is rather large and the shoulder is rather unstable. Risks such as but not limited to infection, stiffness of the shoulder, nonunion of the fracture, DVTs, risk associated with anesthesia, and the risk surrounding the COVID-19 pandemic were discussed and understood. Patient may require some type of rehab facility upon discharge versus home. We will get case management consulted. Patient to consent to the above procedure. He is to remain n.p.o. in anticipation of surgery later today. This plan was discussed in thorough detail with the patient and his family and they agree with the plan at this time. Dr. Kareem Goncalves has seen and evaluated the patient and agrees with the above noted findings and plan.         Ramón Jo PA-C

## 2023-03-12 NOTE — PROGRESS NOTES
Pt admitted to  7K7 via cart/stretcher. Complaints: Right Shoulder Pain. IV lactated Ringer's infusing into the forearm left, condition patent and no redness at a rate of by gravity with about  mls in the bag still. IV site free of s/s of infection or infiltration. Vital signs obtained. Assessment and data collection initiated. Two nurse skin assessment performed by Oni Roberts and Tyesha AUGUSTIN. Oriented to room. Explained patients right to have family, representative or physician notified of their admission. Patient has Declined for physician to be notified. Patient has Declined for family/representative to be notified. The patient is interested in [facility]. [facility]s meds to beds program?:  No    Policies and procedures for  explained. All questions answered with no further questions at this time. Fall prevention and safety brochure discussed with patient. Bed alarm on. Call light in reach.

## 2023-03-13 LAB
ANION GAP SERPL CALC-SCNC: 9 MEQ/L (ref 8–16)
BASOPHILS ABSOLUTE: 0 THOU/MM3 (ref 0–0.1)
BASOPHILS NFR BLD AUTO: 0.4 %
BUN SERPL-MCNC: 26 MG/DL (ref 7–22)
CALCIUM SERPL-MCNC: 8.6 MG/DL (ref 8.5–10.5)
CHLORIDE SERPL-SCNC: 103 MEQ/L (ref 98–111)
CO2 SERPL-SCNC: 25 MEQ/L (ref 23–33)
CREAT SERPL-MCNC: 0.9 MG/DL (ref 0.4–1.2)
DEPRECATED RDW RBC AUTO: 45.2 FL (ref 35–45)
EOSINOPHIL NFR BLD AUTO: 1.8 %
EOSINOPHILS ABSOLUTE: 0.2 THOU/MM3 (ref 0–0.4)
ERYTHROCYTE [DISTWIDTH] IN BLOOD BY AUTOMATED COUNT: 13.2 % (ref 11.5–14.5)
GFR SERPL CREATININE-BSD FRML MDRD: > 60 ML/MIN/1.73M2
GLUCOSE SERPL-MCNC: 123 MG/DL (ref 70–108)
HCT VFR BLD AUTO: 40.6 % (ref 42–52)
HGB BLD-MCNC: 13.6 GM/DL (ref 14–18)
IMM GRANULOCYTES # BLD AUTO: 0.06 THOU/MM3 (ref 0–0.07)
IMM GRANULOCYTES NFR BLD AUTO: 0.7 %
LYMPHOCYTES ABSOLUTE: 2.1 THOU/MM3 (ref 1–4.8)
LYMPHOCYTES NFR BLD AUTO: 24.8 %
MCH RBC QN AUTO: 31.3 PG (ref 26–33)
MCHC RBC AUTO-ENTMCNC: 33.5 GM/DL (ref 32.2–35.5)
MCV RBC AUTO: 93.5 FL (ref 80–94)
MONOCYTES ABSOLUTE: 1.1 THOU/MM3 (ref 0.4–1.3)
MONOCYTES NFR BLD AUTO: 12.7 %
NEUTROPHILS NFR BLD AUTO: 59.6 %
NRBC BLD AUTO-RTO: 0 /100 WBC
PLATELET # BLD AUTO: 175 THOU/MM3 (ref 130–400)
PMV BLD AUTO: 8.5 FL (ref 9.4–12.4)
POTASSIUM SERPL-SCNC: 4.5 MEQ/L (ref 3.5–5.2)
RBC # BLD AUTO: 4.34 MILL/MM3 (ref 4.7–6.1)
SEGMENTED NEUTROPHILS ABSOLUTE COUNT: 5 THOU/MM3 (ref 1.8–7.7)
SODIUM SERPL-SCNC: 137 MEQ/L (ref 135–145)
WBC # BLD AUTO: 8.4 THOU/MM3 (ref 4.8–10.8)

## 2023-03-13 PROCEDURE — 99232 SBSQ HOSP IP/OBS MODERATE 35: CPT

## 2023-03-13 PROCEDURE — 6370000000 HC RX 637 (ALT 250 FOR IP): Performed by: PHYSICIAN ASSISTANT

## 2023-03-13 PROCEDURE — 1200000000 HC SEMI PRIVATE

## 2023-03-13 PROCEDURE — 80048 BASIC METABOLIC PNL TOTAL CA: CPT

## 2023-03-13 PROCEDURE — 2580000003 HC RX 258: Performed by: PHYSICIAN ASSISTANT

## 2023-03-13 PROCEDURE — 85025 COMPLETE CBC W/AUTO DIFF WBC: CPT

## 2023-03-13 PROCEDURE — 36415 COLL VENOUS BLD VENIPUNCTURE: CPT

## 2023-03-13 RX ADMIN — HYDROCODONE BITARTRATE AND ACETAMINOPHEN 2 TABLET: 5; 325 TABLET ORAL at 03:47

## 2023-03-13 RX ADMIN — CYCLOBENZAPRINE 10 MG: 10 TABLET, FILM COATED ORAL at 14:21

## 2023-03-13 RX ADMIN — HYDROCODONE BITARTRATE AND ACETAMINOPHEN 2 TABLET: 5; 325 TABLET ORAL at 08:39

## 2023-03-13 RX ADMIN — HYDROCODONE BITARTRATE AND ACETAMINOPHEN 2 TABLET: 5; 325 TABLET ORAL at 13:07

## 2023-03-13 RX ADMIN — SODIUM CHLORIDE, PRESERVATIVE FREE 10 ML: 5 INJECTION INTRAVENOUS at 08:38

## 2023-03-13 RX ADMIN — HYDROCODONE BITARTRATE AND ACETAMINOPHEN 2 TABLET: 5; 325 TABLET ORAL at 17:15

## 2023-03-13 RX ADMIN — SODIUM CHLORIDE, PRESERVATIVE FREE 10 ML: 5 INJECTION INTRAVENOUS at 20:35

## 2023-03-13 ASSESSMENT — PAIN DESCRIPTION - FREQUENCY: FREQUENCY: CONTINUOUS

## 2023-03-13 ASSESSMENT — PAIN DESCRIPTION - ORIENTATION
ORIENTATION: RIGHT
ORIENTATION: RIGHT

## 2023-03-13 ASSESSMENT — PAIN - FUNCTIONAL ASSESSMENT
PAIN_FUNCTIONAL_ASSESSMENT: ACTIVITIES ARE NOT PREVENTED
PAIN_FUNCTIONAL_ASSESSMENT: PREVENTS OR INTERFERES SOME ACTIVE ACTIVITIES AND ADLS
PAIN_FUNCTIONAL_ASSESSMENT: 0-10
PAIN_FUNCTIONAL_ASSESSMENT: ACTIVITIES ARE NOT PREVENTED

## 2023-03-13 ASSESSMENT — PAIN SCALES - GENERAL
PAINLEVEL_OUTOF10: 5
PAINLEVEL_OUTOF10: 6
PAINLEVEL_OUTOF10: 5
PAINLEVEL_OUTOF10: 3
PAINLEVEL_OUTOF10: 7
PAINLEVEL_OUTOF10: 6

## 2023-03-13 ASSESSMENT — PAIN DESCRIPTION - LOCATION
LOCATION: SHOULDER
LOCATION: SHOULDER

## 2023-03-13 ASSESSMENT — PAIN DESCRIPTION - DESCRIPTORS
DESCRIPTORS: ACHING

## 2023-03-13 ASSESSMENT — PAIN DESCRIPTION - PAIN TYPE
TYPE: ACUTE PAIN
TYPE: SURGICAL PAIN

## 2023-03-13 ASSESSMENT — PAIN DESCRIPTION - ONSET: ONSET: ON-GOING

## 2023-03-13 NOTE — ANESTHESIA POSTPROCEDURE EVALUATION
Department of Anesthesiology  Postprocedure Note    Patient: Elieser Reardon  MRN: [id number]  YOB: 1950  Date of evaluation: 3/12/2023      Procedure Summary     Date: 03/12/23 Room / Location: [place] [address] [facility] [place] [facility]    Anesthesia Start: 1950 Anesthesia Stop: 2014    Procedure: CLOSED SHOULDER REDUCTION (Right: Shoulder) Diagnosis:       Fracture      (Fracture [T14. 8XXA])    Surgeons: Ye Mei MD Responsible Provider:     Anesthesia Type: general ASA Status: 2          Anesthesia Type: No value filed.     Maynor Phase I: Maynor Score: 10    Maynor Phase II:        Anesthesia Post Evaluation    Patient location during evaluation: bedside  Patient participation: complete - patient participated  Level of consciousness: awake  Airway patency: patent  Nausea & Vomiting: no vomiting and no nausea  Complications: no  Cardiovascular status: hemodynamically stable  Respiratory status: acceptable  Hydration status: stable

## 2023-03-13 NOTE — BRIEF OP NOTE
Brief Postoperative Note      Patient: Bronson Juan  YOB: 1950  MRN: [id number]    Date of Procedure: 3/12/2023    Pre-Op Diagnosis:   Right proximal humerus fracture dislocation    Post-Op Diagnosis: Same       Procedure(s):  Closed reduction of right proximal humerus fracture dislocation    Surgeon(s):  Tiago Olmedo MD    Assistant:  Physician Assistant: ROBERT Hatfield    Anesthesia: General    Estimated Blood Loss (mL): None    Complications: None    Specimens:   * No specimens in log *    Implants:  * No implants in log *      Drains: * No LDAs found *    Findings: successful reduction with confirmed reduction on xrays    Electronically signed by Tiago Olmedo MD on 3/12/2023 at 8:24 PM

## 2023-03-13 NOTE — PLAN OF CARE
Problem: Discharge Planning  Goal: Discharge to home or other facility with appropriate resources  Flowsheets (Taken 3/13/2023 0315)  Discharge to home or other facility with appropriate resources:   Identify barriers to discharge with patient and caregiver   Arrange for needed discharge resources and transportation as appropriate   Identify discharge learning needs (meds, wound care, etc)   Arrange for interpreters to assist at discharge as needed     Problem: Pain  Goal: Verbalizes/displays adequate comfort level or baseline comfort level  Flowsheets (Taken 3/13/2023 0316)  Verbalizes/displays adequate comfort level or baseline comfort level:   Encourage patient to monitor pain and request assistance   Assess pain using appropriate pain scale   Administer analgesics based on type and severity of pain and evaluate response   Implement non-pharmacological measures as appropriate and evaluate response     Problem: Safety - Adult  Goal: Free from fall injury  Flowsheets (Taken 3/13/2023 0316)  Free From Fall Injury: Instruct family/caregiver on patient safety     Problem: ABCDS Injury Assessment  Goal: Absence of physical injury  Flowsheets (Taken 3/13/2023 0316)  Absence of Physical Injury: Implement safety measures based on patient assessment

## 2023-03-13 NOTE — OP NOTE
Operative Note      Patient: Bronson Juan  YOB: 1950  MRN: [id number]    Date of Procedure: 3/12/2023    Pre-Op Diagnosis:   Closed right proximal humerus fracture dislocation    Post-Op Diagnosis: Same       Procedure(s):  Closed reduction right proximal humerus fracture dislocation    Surgeon(s):  Tiago Olmedo MD    Assistant:   Physician Assistant: ROBERT Hatfield    Anesthesia: General    Estimated Blood Loss (mL): none    Complications: None    Specimens:   * No specimens in log *    Implants:  * No implants in log *      Drains: * No LDAs found *    Findings: successful reduction with xray confirmation    Detailed Description of Procedure: Indications:  Bronson Juan is a 71 y.o. male with history of alcoholism who presented with closed displaced right proximal humerus fracture dislocation. He does not recall the events as he was grossly intoxicated and when awakening had pains in the right shoulder. Operative and non-operative interventions were discussed. After discussion operative intervention with closed versus open reduction of the right proximal humerus fracture was recommended. Risks, benefits, and alternatives of the procedure including pain, bleeding, infection, blood clots in legs or lungs, anesthesia complications, death were all reviewed with the patient and informed consent was obtained. Procedure:   After identification and marking in the pre-operative holding area, the patient was transported to the operating room and propofol induced sedation. A time out was performed confirming correct patient, site, and surgery. Gross traction and gentle countertraction were applied to the right shoulder. Gentle manipulation of the head in the axilla was also performed and a palpable reduction was felt. X-rays were obtained in the lateral and AP planes demonstrating reduction on the right shoulder with minimally displaced right greater tuberosity fracture.  A sling and swathe with extra ACE wrap was applied with the arm in an internally rotated position. The patient was awoken from anesthesia and was much more comfortable. Post-Operative Plan: We will monitor him on the floor and allow for detox before potential operative intervention on the right shoulder.      Electronically signed by Roxy Felder MD on 3/12/2023 at 8:26 PM

## 2023-03-13 NOTE — CARE COORDINATION
Case Management Assessment  Initial Evaluation    Date/Time of Evaluation: 3/13/2023 12:31 PM  Assessment Completed by: Lana Sheldon RN    If patient is discharged prior to next notation, then this note serves as note for discharge by case management. Patient Name: Madan Newton                   YOB: 1950  Diagnosis: Acute alcoholic intoxication with complication [facility]) [U19.765]  Closed fracture dislocation of right shoulder, initial encounter [S42.91XA]  Traumatic closed displaced fracture of right shoulder with anterior dislocation, initial encounter Lennie Cutler                   Date / Time: 3/12/2023  7:21 AM  Location: [address] [place] [address]     Patient Admission Status: Inpatient   Readmission Risk Low 0-14, Mod 15-19), High > 20: Readmission Risk Score: 11.4    Current PCP: No primary care provider on file. PCP verified by CM? Yes    Chart Reviewed: Yes      History Provided by: Patient  Patient Orientation: Alert and Oriented    Patient Cognition: Alert    Hospitalization in the last 30 days (Readmission):  No    If yes, Readmission Assessment in CM Navigator will be completed. Advance Directives:      Code Status: Full Code   Patient's Primary Decision Maker is: Patient Declined (Legal Next of Kin Remains as Decision Maker)      Discharge Planning:    Patient lives with: Family Members Type of Home: House  Primary Care Giver: Self  Patient Support Systems include: Family Members, Children   Current Financial resources: Medicare  Current community resources:    Current services prior to admission: None            Current DME:              Type of Home Care services:  None    ADLS  Prior functional level: Independent in ADLs/IADLs  Current functional level: Independent in ADLs/IADLs    Family can provide assistance at DC: Yes  Would you like Case Management to discuss the discharge plan with any other family members/significant others, and if so, who?  No  Plans to Return to Present Housing: Yes  Other Identified Issues/Barriers to RETURNING to current housing: None  Potential Assistance needed at discharge: N/A            Potential DME:    Patient expects to discharge to: [facility] for transportation at discharge: Family    Financial    Payor: Octavia Ashton / Plan: MEDICARE PART A AND B / Product Type: *No Product type* /     Does insurance require precert for SNF: No    Potential assistance Purchasing Medications: No  Meds-to-Beds request: Yes    No Pharmacies Listed    Notes:    Factors facilitating achievement of predicted outcomes: Family support, Cooperative, and Pleasant    Barriers to discharge: Pain, Medical complications, and pending surgery    Additional Case Management Notes: Pt admitted through ER after fall with drinking and right shoulder pain. IV and PO PRN pain meds, Sling to right arm-NWB, up as tolerated    Procedure: 3/12: Closed reduction of right proximal humerus fracture dislocation    The Plan for Transition of Care is related to the following treatment goals of Acute alcoholic intoxication with complication [facility]) [B40.655]  Closed fracture dislocation of right shoulder, initial encounter [S42.91XA]  Traumatic closed displaced fracture of right shoulder with anterior dislocation, initial encounter [S42.91XA]    Patient Goals/Plan/Treatment Preferences: Spoke with pt and daughter Liliam Harvey. Prior to admission pt independent in ADL's. Lives with family. Works with cattle. Declines need for any DME. Pt would like to be set up with residency clinic at discharge for follow up as does not have PCP on file. Verified insurance. Will continue to follow for home going needs. Transportation/Food Security/Housekeeping Addressed: No issues identified.      Jennifer Rogers RN  Case Management Department

## 2023-03-13 NOTE — DISCHARGE INSTRUCTIONS
Corbin Griffin MD       ACTIVITY / WEIGHTBEARING  INSTRUCTIONS:  Non-Weightbearing to right upper extremity. Sling at all times, do not remove and do not attempt any kind of range of motion or use of the right shoulder    DIET:  Increase Fluid/Water intake, eat foods high in fiber, fruits and vegetables to help to prevent constipation. MEDICATION INSTRUCTIONS:  Take pain medication as prescribed. Norco 5-325mg, 1 tablet every 6 hours as needed for pain  See orders regarding resuming your home medications and any new medications. Continue blood thinner if ordered by your physician. FOLLOW-UP CARE:  If a follow-up appointment was not made for you at discharge, call [phone] [facility] - INPATIENT office) to schedule an appointment for 2 weeks. Call Dr Toby Rivas office with any concerns. Signs of infection such as fever, chills, redness, pus, or bad smelling discharge from incision site. Excessive bleeding, swelling, increasing pain, or discharge around incision site. The stitches or staples come apart at the incision site. Cough shortness of breath, or chest pain. Severe nausea or vomiting. Pain that you cannot control with the medications you have been given or  pain becomes worse. Numbness, tingling, or loss of feeling in your leg, knee, or foot. Pain, burning, urgency, or frequency of urination. If a medical emergency, please call 911 or go to your local emergency room.
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
## 2023-03-13 NOTE — PROGRESS NOTES
Orthopaedic Progress Note      SUBJECTIVE   Mr. Aure Garcia is post op day # 1 closed reduction R proximal humerus fracture dislocation     Seen at bedside this morning  no adverse events overnight  Pain well controlled, tolerating oral diet  Denies any numbness/paresthesia in operative extremity  Maintained sling   Denied n/v, sweating, headache, hallucinations      OBJECTIVE      Physical    VITALS:  BP (!) 146/76   Pulse 84   Temp 98.8 °F (37.1 °C) (Oral)   Resp 16   Ht 6' 1\" (1.854 m)   Wt 209 lb 3.5 oz (94.9 kg)   SpO2 94%   BMI 27.60 kg/m²   I/O last 3 completed shifts: In: 100 [I.V.:100]  Out: 350 [Urine:350]    4/10 pain  Gen: alert and oriented, no hallucinations, no tremor, calm, not agitated  Head: normorcephalic, atraumatic  Resp: unlabored, room air  Pelvis: stable  RUE: sling intact, RMU/AIN/PIN/Ax motor intact. RMUAx SILT. Finger F/E intact, composite fist. 2+ radial pulse.       Data  CBC:   Lab Results   Component Value Date/Time    WBC 8.4 03/13/2023 07:48 AM    HGB 13.6 03/13/2023 07:48 AM     03/13/2023 07:48 AM     BMP:    Lab Results   Component Value Date/Time     03/13/2023 07:48 AM    K 4.5 03/13/2023 07:48 AM     03/13/2023 07:48 AM    CO2 25 03/13/2023 07:48 AM    BUN 26 03/13/2023 07:48 AM    CREATININE 0.9 03/13/2023 07:48 AM    CALCIUM 8.6 03/13/2023 07:48 AM    GLUCOSE 123 03/13/2023 07:48 AM     Uric Acid:  No components found for: URIC  PT/INR:  No results found for: PROTIME, INR  Troponin:  No results found for: TROPONINI  Urine Culture:  No components found for: CURINE      Current Inpatient Medications    Current Facility-Administered Medications: ondansetron (ZOFRAN-ODT) disintegrating tablet 4 mg, 4 mg, Oral, Q8H PRN **OR** ondansetron (ZOFRAN) injection 4 mg, 4 mg, IntraVENous, Q6H PRN  morphine (PF) injection 2 mg, 2 mg, IntraVENous, Q2H PRN **OR** morphine injection 4 mg, 4 mg, IntraVENous, Q2H PRN  sodium chloride flush 0.9 % injection 5-40 mL, 5-40 mL, IntraVENous, 2 times per day  sodium chloride flush 0.9 % injection 5-40 mL, 5-40 mL, IntraVENous, PRN  0.9 % sodium chloride infusion, , IntraVENous, PRN  acetaminophen (TYLENOL) tablet 650 mg, 650 mg, Oral, Q4H PRN  HYDROcodone-acetaminophen (NORCO) 5-325 MG per tablet 1 tablet, 1 tablet, Oral, Q4H PRN  HYDROcodone-acetaminophen (NORCO) 5-325 MG per tablet 2 tablet, 2 tablet, Oral, Q4H PRN  cyclobenzaprine (FLEXERIL) tablet 10 mg, 10 mg, Oral, TID PRN        PLAN    Mr. Melgar Police is post op day # 1 closed reduction R proximal humerus fracture dislocation     Doing well  Continue to ADAT  NWB RUE, strict sling use  Continue PT/OT  Will require further surgical intervention to fix fracture, ideally this happens while in house, will coordinate with Dr. Keara Choi stable on the floor  Definitive timing of return to the OR pending

## 2023-03-13 NOTE — PLAN OF CARE
Problem: Discharge Planning  Goal: Discharge to home or other facility with appropriate resources  3/13/2023 1657 by Benny Kaur RN  Outcome: Progressing  Flowsheets (Taken 3/13/2023 1657)  Discharge to home or other facility with appropriate resources:   Identify barriers to discharge with patient and caregiver   Identify discharge learning needs (meds, wound care, etc)   Refer to discharge planning if patient needs post-hospital services based on physician order or complex needs related to functional status, cognitive ability or social support system   Arrange for needed discharge resources and transportation as appropriate     Problem: Pain  Goal: Verbalizes/displays adequate comfort level or baseline comfort level  3/13/2023 1657 by Benny Kaur RN  Outcome: Progressing  Flowsheets (Taken 3/13/2023 1657)  Verbalizes/displays adequate comfort level or baseline comfort level:   Encourage patient to monitor pain and request assistance   Administer analgesics based on type and severity of pain and evaluate response   Consider cultural and social influences on pain and pain management   Assess pain using appropriate pain scale   Implement non-pharmacological measures as appropriate and evaluate response   Notify Licensed Independent Practitioner if interventions unsuccessful or patient reports new pain     Problem: Skin/Tissue Integrity  Goal: Absence of new skin breakdown  Description: 1. Monitor for areas of redness and/or skin breakdown  2. Assess vascular access sites hourly  3. Every 4-6 hours minimum:  Change oxygen saturation probe site  4. Every 4-6 hours:  If on nasal continuous positive airway pressure, respiratory therapy assess nares and determine need for appliance change or resting period.   Outcome: Progressing     Problem: Safety - Adult  Goal: Free from fall injury  3/13/2023 1657 by Benny Kaur RN  Outcome: Progressing  Flowsheets (Taken 3/13/2023 1657)  Free From Fall Injury: Instruct family/caregiver on patient safety     Problem: ABCDS Injury Assessment  Goal: Absence of physical injury  3/13/2023 1657 by Chayo Rockwell RN  Outcome: Progressing  Flowsheets (Taken 3/13/2023 1657)  Absence of Physical Injury: Implement safety measures based on patient assessment     Care plan reviewed with patient. Patient verbalizes understanding of plan of care and contributes to goal setting.

## 2023-03-14 LAB
ANION GAP SERPL CALC-SCNC: 9 MEQ/L (ref 8–16)
BASOPHILS ABSOLUTE: 0 THOU/MM3 (ref 0–0.1)
BASOPHILS NFR BLD AUTO: 0.4 %
BUN SERPL-MCNC: 22 MG/DL (ref 7–22)
CALCIUM SERPL-MCNC: 8.5 MG/DL (ref 8.5–10.5)
CHLORIDE SERPL-SCNC: 101 MEQ/L (ref 98–111)
CO2 SERPL-SCNC: 25 MEQ/L (ref 23–33)
CREAT SERPL-MCNC: 0.9 MG/DL (ref 0.4–1.2)
DEPRECATED RDW RBC AUTO: 45.1 FL (ref 35–45)
EOSINOPHIL NFR BLD AUTO: 1.7 %
EOSINOPHILS ABSOLUTE: 0.1 THOU/MM3 (ref 0–0.4)
ERYTHROCYTE [DISTWIDTH] IN BLOOD BY AUTOMATED COUNT: 13.1 % (ref 11.5–14.5)
GFR SERPL CREATININE-BSD FRML MDRD: > 60 ML/MIN/1.73M2
GLUCOSE SERPL-MCNC: 123 MG/DL (ref 70–108)
HCT VFR BLD AUTO: 40 % (ref 42–52)
HGB BLD-MCNC: 13.1 GM/DL (ref 14–18)
IMM GRANULOCYTES # BLD AUTO: 0.04 THOU/MM3 (ref 0–0.07)
IMM GRANULOCYTES NFR BLD AUTO: 0.5 %
LYMPHOCYTES ABSOLUTE: 1.2 THOU/MM3 (ref 1–4.8)
LYMPHOCYTES NFR BLD AUTO: 16.6 %
MCH RBC QN AUTO: 31 PG (ref 26–33)
MCHC RBC AUTO-ENTMCNC: 32.8 GM/DL (ref 32.2–35.5)
MCV RBC AUTO: 94.8 FL (ref 80–94)
MONOCYTES ABSOLUTE: 0.8 THOU/MM3 (ref 0.4–1.3)
MONOCYTES NFR BLD AUTO: 10.7 %
NEUTROPHILS NFR BLD AUTO: 70.1 %
NRBC BLD AUTO-RTO: 0 /100 WBC
PLATELET # BLD AUTO: 174 THOU/MM3 (ref 130–400)
PMV BLD AUTO: 9 FL (ref 9.4–12.4)
POTASSIUM SERPL-SCNC: 4 MEQ/L (ref 3.5–5.2)
RBC # BLD AUTO: 4.22 MILL/MM3 (ref 4.7–6.1)
SEGMENTED NEUTROPHILS ABSOLUTE COUNT: 5.3 THOU/MM3 (ref 1.8–7.7)
SODIUM SERPL-SCNC: 135 MEQ/L (ref 135–145)
WBC # BLD AUTO: 7.5 THOU/MM3 (ref 4.8–10.8)

## 2023-03-14 PROCEDURE — 80048 BASIC METABOLIC PNL TOTAL CA: CPT

## 2023-03-14 PROCEDURE — 36415 COLL VENOUS BLD VENIPUNCTURE: CPT

## 2023-03-14 PROCEDURE — 85025 COMPLETE CBC W/AUTO DIFF WBC: CPT

## 2023-03-14 PROCEDURE — 6370000000 HC RX 637 (ALT 250 FOR IP): Performed by: PHYSICIAN ASSISTANT

## 2023-03-14 PROCEDURE — 2580000003 HC RX 258: Performed by: PHYSICIAN ASSISTANT

## 2023-03-14 PROCEDURE — 1200000000 HC SEMI PRIVATE

## 2023-03-14 PROCEDURE — 6360000002 HC RX W HCPCS: Performed by: PHYSICIAN ASSISTANT

## 2023-03-14 RX ORDER — NICOTINE 21 MG/24HR
1 PATCH, TRANSDERMAL 24 HOURS TRANSDERMAL NIGHTLY
Status: DISCONTINUED | OUTPATIENT
Start: 2023-03-14 | End: 2023-03-17 | Stop reason: HOSPADM

## 2023-03-14 RX ADMIN — MORPHINE SULFATE 4 MG: 4 INJECTION, SOLUTION INTRAMUSCULAR; INTRAVENOUS at 09:27

## 2023-03-14 RX ADMIN — HYDROCODONE BITARTRATE AND ACETAMINOPHEN 2 TABLET: 5; 325 TABLET ORAL at 12:47

## 2023-03-14 RX ADMIN — HYDROCODONE BITARTRATE AND ACETAMINOPHEN 2 TABLET: 5; 325 TABLET ORAL at 00:31

## 2023-03-14 RX ADMIN — HYDROCODONE BITARTRATE AND ACETAMINOPHEN 2 TABLET: 5; 325 TABLET ORAL at 18:00

## 2023-03-14 RX ADMIN — MORPHINE SULFATE 4 MG: 4 INJECTION, SOLUTION INTRAMUSCULAR; INTRAVENOUS at 20:31

## 2023-03-14 RX ADMIN — MORPHINE SULFATE 4 MG: 4 INJECTION, SOLUTION INTRAMUSCULAR; INTRAVENOUS at 11:40

## 2023-03-14 RX ADMIN — HYDROCODONE BITARTRATE AND ACETAMINOPHEN 2 TABLET: 5; 325 TABLET ORAL at 23:38

## 2023-03-14 RX ADMIN — HYDROCODONE BITARTRATE AND ACETAMINOPHEN 2 TABLET: 5; 325 TABLET ORAL at 08:13

## 2023-03-14 RX ADMIN — CYCLOBENZAPRINE 10 MG: 10 TABLET, FILM COATED ORAL at 00:30

## 2023-03-14 RX ADMIN — CYCLOBENZAPRINE 10 MG: 10 TABLET, FILM COATED ORAL at 08:13

## 2023-03-14 RX ADMIN — CYCLOBENZAPRINE 10 MG: 10 TABLET, FILM COATED ORAL at 16:25

## 2023-03-14 RX ADMIN — SODIUM CHLORIDE, PRESERVATIVE FREE 10 ML: 5 INJECTION INTRAVENOUS at 08:13

## 2023-03-14 RX ADMIN — SODIUM CHLORIDE, PRESERVATIVE FREE 10 ML: 5 INJECTION INTRAVENOUS at 20:29

## 2023-03-14 ASSESSMENT — PAIN DESCRIPTION - FREQUENCY
FREQUENCY: CONTINUOUS
FREQUENCY: CONTINUOUS

## 2023-03-14 ASSESSMENT — PAIN SCALES - GENERAL
PAINLEVEL_OUTOF10: 8
PAINLEVEL_OUTOF10: 7
PAINLEVEL_OUTOF10: 7
PAINLEVEL_OUTOF10: 8
PAINLEVEL_OUTOF10: 7
PAINLEVEL_OUTOF10: 7
PAINLEVEL_OUTOF10: 6
PAINLEVEL_OUTOF10: 7
PAINLEVEL_OUTOF10: 8
PAINLEVEL_OUTOF10: 7

## 2023-03-14 ASSESSMENT — PAIN DESCRIPTION - DESCRIPTORS
DESCRIPTORS: ACHING

## 2023-03-14 ASSESSMENT — PAIN DESCRIPTION - PAIN TYPE
TYPE: ACUTE PAIN;SURGICAL PAIN
TYPE: ACUTE PAIN;SURGICAL PAIN

## 2023-03-14 ASSESSMENT — PAIN DESCRIPTION - LOCATION
LOCATION: SHOULDER
LOCATION: SHOULDER

## 2023-03-14 ASSESSMENT — PAIN DESCRIPTION - ORIENTATION
ORIENTATION: RIGHT
ORIENTATION: RIGHT

## 2023-03-14 ASSESSMENT — PAIN DESCRIPTION - ONSET
ONSET: ON-GOING
ONSET: ON-GOING

## 2023-03-14 ASSESSMENT — PAIN - FUNCTIONAL ASSESSMENT
PAIN_FUNCTIONAL_ASSESSMENT: ACTIVITIES ARE NOT PREVENTED
PAIN_FUNCTIONAL_ASSESSMENT: ACTIVITIES ARE NOT PREVENTED
PAIN_FUNCTIONAL_ASSESSMENT: 0-10
PAIN_FUNCTIONAL_ASSESSMENT: ACTIVITIES ARE NOT PREVENTED

## 2023-03-14 NOTE — CARE COORDINATION
3/14/23, 10:45 AM EDT    [facility] labscorey 91 day: 2  Location: -07/007-A Reason for admit: Acute alcoholic intoxication with complication [facility]) [J39.621]  Closed fracture dislocation of right shoulder, initial encounter [S42.91XA]  Traumatic closed displaced fracture of right shoulder with anterior dislocation, initial encounter [S42.91XA]   Procedure:   3/12: Closed reduction of right proximal humerus fracture dislocation  Barriers to Discharge: Ortho following. POD 2. Anticipate return to OR on 3/16. PT/OT. Pain control. PCP: No primary care provider on file. Readmission Risk Score: 8.2%  Patient Goals/Plan/Treatment Preferences: From home with family. [facility] at [facility]. Denied needs for dme.

## 2023-03-14 NOTE — PLAN OF CARE
Problem: Discharge Planning  Goal: Discharge to home or other facility with appropriate resources  Outcome: Progressing  Flowsheets (Taken 3/14/2023 1711)  Discharge to home or other facility with appropriate resources:   Identify barriers to discharge with patient and caregiver   Identify discharge learning needs (meds, wound care, etc)   Refer to discharge planning if patient needs post-hospital services based on physician order or complex needs related to functional status, cognitive ability or social support system   Arrange for needed discharge resources and transportation as appropriate     Problem: Pain  Goal: Verbalizes/displays adequate comfort level or baseline comfort level  Outcome: Progressing  Flowsheets (Taken 3/14/2023 1711)  Verbalizes/displays adequate comfort level or baseline comfort level:   Encourage patient to monitor pain and request assistance   Administer analgesics based on type and severity of pain and evaluate response   Consider cultural and social influences on pain and pain management   Assess pain using appropriate pain scale   Implement non-pharmacological measures as appropriate and evaluate response   Notify Licensed Independent Practitioner if interventions unsuccessful or patient reports new pain     Problem: Skin/Tissue Integrity  Goal: Absence of new skin breakdown  Description: 1. Monitor for areas of redness and/or skin breakdown  2. Assess vascular access sites hourly  3. Every 4-6 hours minimum:  Change oxygen saturation probe site  4. Every 4-6 hours:  If on nasal continuous positive airway pressure, respiratory therapy assess nares and determine need for appliance change or resting period.   Outcome: Progressing     Problem: Safety - Adult  Goal: Free from fall injury  Outcome: Progressing  Flowsheets (Taken 3/14/2023 1711)  Free From Fall Injury: Instruct family/caregiver on patient safety     Problem: Skin/Tissue Integrity - Adult  Goal: Skin integrity remains intact  Outcome: Progressing  Flowsheets (Taken 3/14/2023 1711)  Skin Integrity Remains Intact:   Monitor for areas of redness and/or skin breakdown   Assess vascular access sites hourly     Problem: Skin/Tissue Integrity - Adult  Goal: Incisions, wounds, or drain sites healing without S/S of infection  Outcome: Progressing  Flowsheets (Taken 3/14/2023 1711)  Incisions, Wounds, or Drain Sites Healing Without Sign and Symptoms of Infection:   ADMISSION and DAILY: Assess and document risk factors for pressure ulcer development   TWICE DAILY: Assess and document skin integrity   TWICE DAILY: Assess and document dressing/incision, wound bed, drain sites and surrounding tissue   Implement wound care per orders     Problem: Musculoskeletal - Adult  Goal: Return mobility to safest level of function  Outcome: Progressing  Flowsheets (Taken 3/14/2023 1711)  Return Mobility to Safest Level of Function:   Assess patient stability and activity tolerance for standing, transferring and ambulating with or without assistive devices   Assist with transfers and ambulation using safe patient handling equipment as needed   Ensure adequate protection for wounds/incisions during mobilization   Obtain physical therapy/occupational therapy consults as needed   Instruct patient/family in ordered activity level     Problem: Musculoskeletal - Adult  Goal: Maintain proper alignment of affected body part  Outcome: Progressing  Flowsheets (Taken 3/14/2023 1711)  Maintain proper alignment of affected body part: Support and protect limb and body alignment per provider's orders     Problem: Musculoskeletal - Adult  Goal: Return ADL status to a safe level of function  Outcome: Progressing  Flowsheets (Taken 3/14/2023 1711)  Return ADL Status to a Safe Level of Function:   Administer medication as ordered   Assess activities of daily living deficits and provide assistive devices as needed   Assist and instruct patient to increase activity and self care as tolerated   Obtain physical therapy/occupational therapy consults as needed     Problem: Infection - Adult  Goal: Absence of infection during hospitalization  Outcome: Progressing  Flowsheets (Taken 3/14/2023 1711)  Absence of infection during hospitalization:   Assess and monitor for signs and symptoms of infection   Monitor lab/diagnostic results   Monitor all insertion sites i.e., indwelling lines, tubes and drains   Administer medications as ordered   Instruct and encourage patient and family to use good hand hygiene technique     Problem: Nutrition Deficit:  Goal: Optimize nutritional status  Outcome: Progressing  Flowsheets (Taken 3/14/2023 1711)  Nutrient intake appropriate for improving, restoring, or maintaining nutritional needs:   Assess nutritional status and recommend course of action   Monitor oral intake, labs, and treatment plans     Problem: ABCDS Injury Assessment  Goal: Absence of physical injury  Outcome: Adequate for Discharge  Flowsheets (Taken 3/14/2023 1711)  Absence of Physical Injury: Implement safety measures based on patient assessment     Care plan reviewed with patient. Patient verbalizes understanding of plan of care and contributes to goal setting.

## 2023-03-14 NOTE — PROGRESS NOTES
Orthopaedic Progress Note      SUBJECTIVE   Mr. Yary Chapman is post op day #2 closed reduction R proximal humerus fracture dislocation     Seen at bedside this morning  no adverse events overnight  Pain well controlled, tolerating oral diet  Denies any numbness/paresthesia in operative extremity  Maintained sling, soreness with immobilization but no feelings of instability  Denied n/v, sweating, headache, hallucinations      OBJECTIVE      Physical    VITALS:  BP (!) 151/85   Pulse 97   Temp 98.5 °F (36.9 °C) (Oral)   Resp 18   Ht 6' 1\" (1.854 m)   Wt 209 lb 3.5 oz (94.9 kg)   SpO2 94%   BMI 27.60 kg/m²   I/O last 3 completed shifts: In: 0822 [P.O.:1380; I.V.:110]  Out: 850 [Urine:850]    5/10 pain  Gen: alert and oriented, no hallucinations, no tremor, calm, not agitated  Head: normorcephalic, atraumatic  Resp: unlabored, room air  Pelvis: stable  RUE: sling intact, RMU/AIN/PIN/Ax motor intact. RMUAx SILT. Finger F/E intact, composite fist. 2+ radial pulse.       Data  CBC:   Lab Results   Component Value Date/Time    WBC 7.5 03/14/2023 06:02 AM    HGB 13.1 03/14/2023 06:02 AM     03/14/2023 06:02 AM     BMP:    Lab Results   Component Value Date/Time     03/14/2023 06:02 AM    K 4.0 03/14/2023 06:02 AM     03/14/2023 06:02 AM    CO2 25 03/14/2023 06:02 AM    BUN 22 03/14/2023 06:02 AM    CREATININE 0.9 03/14/2023 06:02 AM    CALCIUM 8.5 03/14/2023 06:02 AM    GLUCOSE 123 03/14/2023 06:02 AM     Uric Acid:  No components found for: URIC  PT/INR:  No results found for: PROTIME, INR  Troponin:  No results found for: TROPONINI  Urine Culture:  No components found for: CURINE      Current Inpatient Medications    Current Facility-Administered Medications: ondansetron (ZOFRAN-ODT) disintegrating tablet 4 mg, 4 mg, Oral, Q8H PRN **OR** ondansetron (ZOFRAN) injection 4 mg, 4 mg, IntraVENous, Q6H PRN  morphine (PF) injection 2 mg, 2 mg, IntraVENous, Q2H PRN **OR** morphine injection 4 mg, 4 mg, IntraVENous, Q2H PRN  sodium chloride flush 0.9 % injection 5-40 mL, 5-40 mL, IntraVENous, 2 times per day  sodium chloride flush 0.9 % injection 5-40 mL, 5-40 mL, IntraVENous, PRN  0.9 % sodium chloride infusion, , IntraVENous, PRN  acetaminophen (TYLENOL) tablet 650 mg, 650 mg, Oral, Q4H PRN  HYDROcodone-acetaminophen (NORCO) 5-325 MG per tablet 1 tablet, 1 tablet, Oral, Q4H PRN  HYDROcodone-acetaminophen (NORCO) 5-325 MG per tablet 2 tablet, 2 tablet, Oral, Q4H PRN  cyclobenzaprine (FLEXERIL) tablet 10 mg, 10 mg, Oral, TID PRN        PLAN    Mr. Jana Fine is post op day # 2 closed reduction R proximal humerus fracture dislocation     Doing well  Continue to ADAT  NWB RUE, strict sling use  Continue PT/OT  Tentative plan for OR 3/16/23 with Dr Elsa Mckenzie stable on the floor

## 2023-03-15 LAB
ANION GAP SERPL CALC-SCNC: 8 MEQ/L (ref 8–16)
BASOPHILS ABSOLUTE: 0 THOU/MM3 (ref 0–0.1)
BASOPHILS NFR BLD AUTO: 0.6 %
BUN SERPL-MCNC: 22 MG/DL (ref 7–22)
CALCIUM SERPL-MCNC: 8.3 MG/DL (ref 8.5–10.5)
CHLORIDE SERPL-SCNC: 96 MEQ/L (ref 98–111)
CO2 SERPL-SCNC: 26 MEQ/L (ref 23–33)
CREAT SERPL-MCNC: 1 MG/DL (ref 0.4–1.2)
DEPRECATED RDW RBC AUTO: 44.1 FL (ref 35–45)
EOSINOPHIL NFR BLD AUTO: 1.8 %
EOSINOPHILS ABSOLUTE: 0.1 THOU/MM3 (ref 0–0.4)
ERYTHROCYTE [DISTWIDTH] IN BLOOD BY AUTOMATED COUNT: 12.6 % (ref 11.5–14.5)
GFR SERPL CREATININE-BSD FRML MDRD: > 60 ML/MIN/1.73M2
GLUCOSE SERPL-MCNC: 110 MG/DL (ref 70–108)
HCT VFR BLD AUTO: 37.7 % (ref 42–52)
HGB BLD-MCNC: 12.6 GM/DL (ref 14–18)
IMM GRANULOCYTES # BLD AUTO: 0.04 THOU/MM3 (ref 0–0.07)
IMM GRANULOCYTES NFR BLD AUTO: 0.8 %
LYMPHOCYTES ABSOLUTE: 1 THOU/MM3 (ref 1–4.8)
LYMPHOCYTES NFR BLD AUTO: 20.1 %
MCH RBC QN AUTO: 32.1 PG (ref 26–33)
MCHC RBC AUTO-ENTMCNC: 33.4 GM/DL (ref 32.2–35.5)
MCV RBC AUTO: 95.9 FL (ref 80–94)
MONOCYTES ABSOLUTE: 0.6 THOU/MM3 (ref 0.4–1.3)
MONOCYTES NFR BLD AUTO: 12 %
NEUTROPHILS NFR BLD AUTO: 64.7 %
NRBC BLD AUTO-RTO: 0 /100 WBC
PLATELET # BLD AUTO: 159 THOU/MM3 (ref 130–400)
PMV BLD AUTO: 8.5 FL (ref 9.4–12.4)
POTASSIUM SERPL-SCNC: 4.1 MEQ/L (ref 3.5–5.2)
RBC # BLD AUTO: 3.93 MILL/MM3 (ref 4.7–6.1)
SEGMENTED NEUTROPHILS ABSOLUTE COUNT: 3.3 THOU/MM3 (ref 1.8–7.7)
SODIUM SERPL-SCNC: 130 MEQ/L (ref 135–145)
WBC # BLD AUTO: 5.1 THOU/MM3 (ref 4.8–10.8)

## 2023-03-15 PROCEDURE — 6360000002 HC RX W HCPCS: Performed by: PHYSICIAN ASSISTANT

## 2023-03-15 PROCEDURE — 80048 BASIC METABOLIC PNL TOTAL CA: CPT

## 2023-03-15 PROCEDURE — 2580000003 HC RX 258: Performed by: PHYSICIAN ASSISTANT

## 2023-03-15 PROCEDURE — 36415 COLL VENOUS BLD VENIPUNCTURE: CPT

## 2023-03-15 PROCEDURE — 85025 COMPLETE CBC W/AUTO DIFF WBC: CPT

## 2023-03-15 PROCEDURE — 6370000000 HC RX 637 (ALT 250 FOR IP): Performed by: PHYSICIAN ASSISTANT

## 2023-03-15 PROCEDURE — 1200000000 HC SEMI PRIVATE

## 2023-03-15 RX ADMIN — CYCLOBENZAPRINE 10 MG: 10 TABLET, FILM COATED ORAL at 23:05

## 2023-03-15 RX ADMIN — MORPHINE SULFATE 4 MG: 4 INJECTION, SOLUTION INTRAMUSCULAR; INTRAVENOUS at 07:26

## 2023-03-15 RX ADMIN — MORPHINE SULFATE 4 MG: 4 INJECTION, SOLUTION INTRAMUSCULAR; INTRAVENOUS at 20:10

## 2023-03-15 RX ADMIN — SODIUM CHLORIDE, PRESERVATIVE FREE 10 ML: 5 INJECTION INTRAVENOUS at 20:10

## 2023-03-15 RX ADMIN — HYDROCODONE BITARTRATE AND ACETAMINOPHEN 2 TABLET: 5; 325 TABLET ORAL at 18:50

## 2023-03-15 RX ADMIN — SODIUM CHLORIDE, PRESERVATIVE FREE 10 ML: 5 INJECTION INTRAVENOUS at 09:50

## 2023-03-15 RX ADMIN — HYDROCODONE BITARTRATE AND ACETAMINOPHEN 2 TABLET: 5; 325 TABLET ORAL at 23:05

## 2023-03-15 RX ADMIN — MORPHINE SULFATE 4 MG: 4 INJECTION, SOLUTION INTRAMUSCULAR; INTRAVENOUS at 13:41

## 2023-03-15 RX ADMIN — HYDROCODONE BITARTRATE AND ACETAMINOPHEN 2 TABLET: 5; 325 TABLET ORAL at 09:49

## 2023-03-15 ASSESSMENT — PAIN DESCRIPTION - LOCATION
LOCATION: ARM;SHOULDER
LOCATION: SHOULDER

## 2023-03-15 ASSESSMENT — PAIN - FUNCTIONAL ASSESSMENT
PAIN_FUNCTIONAL_ASSESSMENT: ACTIVITIES ARE NOT PREVENTED

## 2023-03-15 ASSESSMENT — PAIN DESCRIPTION - ORIENTATION
ORIENTATION: RIGHT

## 2023-03-15 ASSESSMENT — PAIN DESCRIPTION - DESCRIPTORS
DESCRIPTORS: ACHING

## 2023-03-15 ASSESSMENT — PAIN SCALES - GENERAL
PAINLEVEL_OUTOF10: 4
PAINLEVEL_OUTOF10: 9
PAINLEVEL_OUTOF10: 8
PAINLEVEL_OUTOF10: 7
PAINLEVEL_OUTOF10: 9
PAINLEVEL_OUTOF10: 9
PAINLEVEL_OUTOF10: 8
PAINLEVEL_OUTOF10: 9

## 2023-03-15 ASSESSMENT — PAIN DESCRIPTION - PAIN TYPE: TYPE: ACUTE PAIN;SURGICAL PAIN

## 2023-03-15 ASSESSMENT — PAIN DESCRIPTION - ONSET: ONSET: ON-GOING

## 2023-03-15 ASSESSMENT — PAIN DESCRIPTION - FREQUENCY: FREQUENCY: CONTINUOUS

## 2023-03-15 NOTE — PROGRESS NOTES
Orthopaedic Progress Note      SUBJECTIVE   Mr. Ramón Luther is post op day #3 closed reduction R proximal humerus fracture dislocation     Seen at bedside this morning  no adverse events overnight  Pain well controlled, tolerating oral diet  Denies any numbness/paresthesia in operative extremity  Maintained sling, soreness with immobilization but no feelings of instability  Denied n/v, sweating, headache, hallucinations      OBJECTIVE      Physical    VITALS:  /72   Pulse (!) 104   Temp 98.5 °F (36.9 °C) (Oral)   Resp 19   Ht 6' 1\" (1.854 m)   Wt 209 lb 3.5 oz (94.9 kg)   SpO2 92%   BMI 27.60 kg/m²   I/O last 3 completed shifts: In: 7960 [P.O.:1356; I.V.:10]  Out: -     6/10 pain  Gen: alert and oriented, no hallucinations, no tremor, calm, not agitated  Head: normorcephalic, atraumatic  Resp: unlabored, room air  Pelvis: stable  RUE: sling intact, RMU/AIN/PIN/Ax motor intact. RMUAx SILT. Finger F/E intact, composite fist. 2+ radial pulse.       Data  CBC:   Lab Results   Component Value Date/Time    WBC 5.1 03/15/2023 05:24 AM    HGB 12.6 03/15/2023 05:24 AM     03/15/2023 05:24 AM     BMP:    Lab Results   Component Value Date/Time     03/15/2023 05:24 AM    K 4.1 03/15/2023 05:24 AM    CL 96 03/15/2023 05:24 AM    CO2 26 03/15/2023 05:24 AM    BUN 22 03/15/2023 05:24 AM    CREATININE 1.0 03/15/2023 05:24 AM    CALCIUM 8.3 03/15/2023 05:24 AM    GLUCOSE 110 03/15/2023 05:24 AM     Uric Acid:  No components found for: URIC  PT/INR:  No results found for: PROTIME, INR  Troponin:  No results found for: TROPONINI  Urine Culture:  No components found for: CURINE      Current Inpatient Medications    Current Facility-Administered Medications: nicotine (NICODERM CQ) 21 MG/24HR 1 patch, 1 patch, TransDERmal, Nightly  ondansetron (ZOFRAN-ODT) disintegrating tablet 4 mg, 4 mg, Oral, Q8H PRN **OR** ondansetron (ZOFRAN) injection 4 mg, 4 mg, IntraVENous, Q6H PRN  morphine (PF) injection 2 mg, 2 mg, IntraVENous, Q2H PRN **OR** morphine injection 4 mg, 4 mg, IntraVENous, Q2H PRN  sodium chloride flush 0.9 % injection 5-40 mL, 5-40 mL, IntraVENous, 2 times per day  sodium chloride flush 0.9 % injection 5-40 mL, 5-40 mL, IntraVENous, PRN  0.9 % sodium chloride infusion, , IntraVENous, PRN  acetaminophen (TYLENOL) tablet 650 mg, 650 mg, Oral, Q4H PRN  HYDROcodone-acetaminophen (NORCO) 5-325 MG per tablet 1 tablet, 1 tablet, Oral, Q4H PRN  HYDROcodone-acetaminophen (NORCO) 5-325 MG per tablet 2 tablet, 2 tablet, Oral, Q4H PRN  cyclobenzaprine (FLEXERIL) tablet 10 mg, 10 mg, Oral, TID PRN        PLAN    Mr. Mariluz Arshad is post op day #3 closed reduction R proximal humerus fracture dislocation     Doing well  Continue to ADAT  NWB RUE, strict sling use  Remains stable on the floor  NPO midnight  Plan for OR 3/15/23 with Dr Yomi Cline  Surgical consent completed  Dispo- pending post operative clinical course

## 2023-03-15 NOTE — PLAN OF CARE
Problem: Discharge Planning  Goal: Discharge to home or other facility with appropriate resources  3/14/2023 2226 by Magno Johnson RN  Outcome: Progressing  Flowsheets (Taken 3/14/2023 2226)  Discharge to home or other facility with appropriate resources:   Identify barriers to discharge with patient and caregiver   Arrange for needed discharge resources and transportation as appropriate   Identify discharge learning needs (meds, wound care, etc)   Refer to discharge planning if patient needs post-hospital services based on physician order or complex needs related to functional status, cognitive ability or social support system     Problem: Pain  Goal: Verbalizes/displays adequate comfort level or baseline comfort level  3/14/2023 2226 by Magno Johnson RN  Outcome: Progressing  Flowsheets (Taken 3/14/2023 2226)  Verbalizes/displays adequate comfort level or baseline comfort level:   Encourage patient to monitor pain and request assistance   Assess pain using appropriate pain scale   Administer analgesics based on type and severity of pain and evaluate response   Implement non-pharmacological measures as appropriate and evaluate response   Notify Licensed Independent Practitioner if interventions unsuccessful or patient reports new pain     Problem: Skin/Tissue Integrity  Goal: Absence of new skin breakdown  Description: 1. Monitor for areas of redness and/or skin breakdown  2. Assess vascular access sites hourly  3. Every 4-6 hours minimum:  Change oxygen saturation probe site  4. Every 4-6 hours:  If on nasal continuous positive airway pressure, respiratory therapy assess nares and determine need for appliance change or resting period. 3/14/2023 2226 by Magno Johnson RN  Outcome: Progressing  Note: No new skin lesions noted this shift. Patient encouraged to reposition every two hours. Skin assessments completed and ongoing.         Problem: Safety - Adult  Goal: Free from fall injury  3/14/2023 2226 by David Michael RN  Outcome: Progressing  Flowsheets (Taken 3/14/2023 2226)  Free From Fall Injury: Instruct family/caregiver on patient safety     Problem: ABCDS Injury Assessment  Goal: Absence of physical injury  3/14/2023 2226 by David Michael RN  Outcome: Progressing  Flowsheets (Taken 3/14/2023 2226)  Absence of Physical Injury: Implement safety measures based on patient assessment     Problem: Skin/Tissue Integrity - Adult  Goal: Skin integrity remains intact  3/14/2023 2226 by David Michael RN  Outcome: Progressing  Flowsheets (Taken 3/14/2023 2226)  Skin Integrity Remains Intact:   Monitor for areas of redness and/or skin breakdown   Assess vascular access sites hourly     Problem: Skin/Tissue Integrity - Adult  Goal: Incisions, wounds, or drain sites healing without S/S of infection  3/14/2023 2226 by David Michael RN  Outcome: Progressing  Flowsheets (Taken 3/14/2023 2226)  Incisions, Wounds, or Drain Sites Healing Without Sign and Symptoms of Infection:   ADMISSION and DAILY: Assess and document risk factors for pressure ulcer development   TWICE DAILY: Assess and document skin integrity   TWICE DAILY: Assess and document dressing/incision, wound bed, drain sites and surrounding tissue   Implement wound care per orders     Problem: Musculoskeletal - Adult  Goal: Return mobility to safest level of function  3/14/2023 2226 by David Michael RN  Outcome: Progressing  Flowsheets (Taken 3/14/2023 2226)  Return Mobility to Safest Level of Function:   Assess patient stability and activity tolerance for standing, transferring and ambulating with or without assistive devices   Ensure adequate protection for wounds/incisions during mobilization   Assist with transfers and ambulation using safe patient handling equipment as needed     Problem: Musculoskeletal - Adult  Goal: Maintain proper alignment of affected body part  3/14/2023 2226 by David Michael RN  Outcome: Progressing  Flowsheets (Taken 3/14/2023 2226)  Maintain proper alignment of affected body part: Support and protect limb and body alignment per provider's orders     Problem: Musculoskeletal - Adult  Goal: Return ADL status to a safe level of function  3/14/2023 2226 by Alexander Llamas RN  Outcome: Progressing  Flowsheets (Taken 3/14/2023 2226)  Return ADL Status to a Safe Level of Function:   Administer medication as ordered   Assess activities of daily living deficits and provide assistive devices as needed     Problem: Infection - Adult  Goal: Absence of infection during hospitalization  3/14/2023 2226 by Alexander Llamas RN  Outcome: Progressing  Flowsheets (Taken 3/14/2023 2226)  Absence of infection during hospitalization:   Monitor lab/diagnostic results   Assess and monitor for signs and symptoms of infection   Monitor all insertion sites i.e., indwelling lines, tubes and drains     Problem: Nutrition Deficit:  Goal: Optimize nutritional status  3/14/2023 2226 by Alexander Llamas RN  Outcome: Progressing  Flowsheets (Taken 3/14/2023 2226)  Nutrient intake appropriate for improving, restoring, or maintaining nutritional needs:   Assess nutritional status and recommend course of action   Monitor oral intake, labs, and treatment plans

## 2023-03-16 ENCOUNTER — ANESTHESIA (OUTPATIENT)
Dept: OPERATING ROOM | Age: 73
End: 2023-03-16
Payer: MEDICARE

## 2023-03-16 ENCOUNTER — APPOINTMENT (OUTPATIENT)
Dept: GENERAL RADIOLOGY | Age: 73
End: 2023-03-16
Payer: MEDICARE

## 2023-03-16 ENCOUNTER — ANESTHESIA EVENT (OUTPATIENT)
Dept: OPERATING ROOM | Age: 73
End: 2023-03-16
Payer: MEDICARE

## 2023-03-16 PROCEDURE — 2580000003 HC RX 258: Performed by: PHYSICIAN ASSISTANT

## 2023-03-16 PROCEDURE — 2500000003 HC RX 250 WO HCPCS: Performed by: ANESTHESIOLOGY

## 2023-03-16 PROCEDURE — 7100000001 HC PACU RECOVERY - ADDTL 15 MIN: Performed by: ORTHOPAEDIC SURGERY

## 2023-03-16 PROCEDURE — C1713 ANCHOR/SCREW BN/BN,TIS/BN: HCPCS | Performed by: ORTHOPAEDIC SURGERY

## 2023-03-16 PROCEDURE — 2720000010 HC SURG SUPPLY STERILE: Performed by: ORTHOPAEDIC SURGERY

## 2023-03-16 PROCEDURE — 7100000000 HC PACU RECOVERY - FIRST 15 MIN: Performed by: ORTHOPAEDIC SURGERY

## 2023-03-16 PROCEDURE — 6360000002 HC RX W HCPCS: Performed by: PHYSICIAN ASSISTANT

## 2023-03-16 PROCEDURE — 3E0T3BZ INTRODUCTION OF ANESTHETIC AGENT INTO PERIPHERAL NERVES AND PLEXI, PERCUTANEOUS APPROACH: ICD-10-PCS | Performed by: ANESTHESIOLOGY

## 2023-03-16 PROCEDURE — 3700000001 HC ADD 15 MINUTES (ANESTHESIA): Performed by: ORTHOPAEDIC SURGERY

## 2023-03-16 PROCEDURE — 6370000000 HC RX 637 (ALT 250 FOR IP): Performed by: PHYSICIAN ASSISTANT

## 2023-03-16 PROCEDURE — 6360000002 HC RX W HCPCS: Performed by: NURSE ANESTHETIST, CERTIFIED REGISTERED

## 2023-03-16 PROCEDURE — 6360000002 HC RX W HCPCS

## 2023-03-16 PROCEDURE — 3600000004 HC SURGERY LEVEL 4 BASE: Performed by: ORTHOPAEDIC SURGERY

## 2023-03-16 PROCEDURE — 2709999900 HC NON-CHARGEABLE SUPPLY: Performed by: ORTHOPAEDIC SURGERY

## 2023-03-16 PROCEDURE — 3209999900 FLUORO FOR SURGICAL PROCEDURES

## 2023-03-16 PROCEDURE — 2580000003 HC RX 258

## 2023-03-16 PROCEDURE — 73060 X-RAY EXAM OF HUMERUS: CPT

## 2023-03-16 PROCEDURE — 3600000014 HC SURGERY LEVEL 4 ADDTL 15MIN: Performed by: ORTHOPAEDIC SURGERY

## 2023-03-16 PROCEDURE — 3700000000 HC ANESTHESIA ATTENDED CARE: Performed by: ORTHOPAEDIC SURGERY

## 2023-03-16 PROCEDURE — 73030 X-RAY EXAM OF SHOULDER: CPT

## 2023-03-16 PROCEDURE — 1200000000 HC SEMI PRIVATE

## 2023-03-16 PROCEDURE — 2500000003 HC RX 250 WO HCPCS

## 2023-03-16 PROCEDURE — 64415 NJX AA&/STRD BRCH PLXS IMG: CPT | Performed by: STUDENT IN AN ORGANIZED HEALTH CARE EDUCATION/TRAINING PROGRAM

## 2023-03-16 PROCEDURE — 0PSC04Z REPOSITION RIGHT HUMERAL HEAD WITH INTERNAL FIXATION DEVICE, OPEN APPROACH: ICD-10-PCS | Performed by: ORTHOPAEDIC SURGERY

## 2023-03-16 PROCEDURE — 2500000003 HC RX 250 WO HCPCS: Performed by: NURSE ANESTHETIST, CERTIFIED REGISTERED

## 2023-03-16 DEVICE — EVOS 3.5MM X 44MM LOCKING SCREW SELF-TAPPING
Type: IMPLANTABLE DEVICE | Site: ARM | Status: FUNCTIONAL
Brand: EVOS

## 2023-03-16 DEVICE — EVOS 3.5MM X 28MM CORTEX SCREW SELF-TAPPING
Type: IMPLANTABLE DEVICE | Site: ARM | Status: FUNCTIONAL
Brand: EVOS

## 2023-03-16 DEVICE — EVOS 3.5MM X 46MM LOCKING SCREW SELF-TAPPING
Type: IMPLANTABLE DEVICE | Site: ARM | Status: FUNCTIONAL
Brand: EVOS

## 2023-03-16 DEVICE — EVOS 3.5MM X 50MM LOCKING SCREW SELF-TAPPING
Type: IMPLANTABLE DEVICE | Site: ARM | Status: FUNCTIONAL
Brand: EVOS

## 2023-03-16 DEVICE — EVOS GREATER TUBEROSITY PLATE 7                                    HOLE 84MM
Type: IMPLANTABLE DEVICE | Site: ARM | Status: FUNCTIONAL
Brand: EVOS

## 2023-03-16 DEVICE — GRAFT BONE 4-10MM 30ML CANC CUBE: Type: IMPLANTABLE DEVICE | Site: ARM | Status: FUNCTIONAL

## 2023-03-16 DEVICE — EVOS 3.5MM X 40MM LOCKING SCREW SELF-TAPPING
Type: IMPLANTABLE DEVICE | Site: ARM | Status: FUNCTIONAL
Brand: EVOS

## 2023-03-16 DEVICE — EVOS 3.5MM X 32MM CORTEX SCREW SELF-TAPPING
Type: IMPLANTABLE DEVICE | Site: ARM | Status: FUNCTIONAL
Brand: EVOS

## 2023-03-16 RX ORDER — ROCURONIUM BROMIDE 10 MG/ML
INJECTION, SOLUTION INTRAVENOUS PRN
Status: DISCONTINUED | OUTPATIENT
Start: 2023-03-16 | End: 2023-03-16 | Stop reason: SDUPTHER

## 2023-03-16 RX ORDER — PHENYLEPHRINE HYDROCHLORIDE 10 MG/ML
INJECTION INTRAVENOUS PRN
Status: DISCONTINUED | OUTPATIENT
Start: 2023-03-16 | End: 2023-03-16 | Stop reason: SDUPTHER

## 2023-03-16 RX ORDER — CYCLOBENZAPRINE HCL 10 MG
10 TABLET ORAL 3 TIMES DAILY PRN
Qty: 30 TABLET | Refills: 0 | Status: SHIPPED | OUTPATIENT
Start: 2023-03-16 | End: 2023-03-26

## 2023-03-16 RX ORDER — MIDAZOLAM HYDROCHLORIDE 1 MG/ML
INJECTION INTRAMUSCULAR; INTRAVENOUS PRN
Status: DISCONTINUED | OUTPATIENT
Start: 2023-03-16 | End: 2023-03-16 | Stop reason: SDUPTHER

## 2023-03-16 RX ORDER — PROPOFOL 10 MG/ML
INJECTION, EMULSION INTRAVENOUS PRN
Status: DISCONTINUED | OUTPATIENT
Start: 2023-03-16 | End: 2023-03-16 | Stop reason: SDUPTHER

## 2023-03-16 RX ORDER — ONDANSETRON 2 MG/ML
INJECTION INTRAMUSCULAR; INTRAVENOUS PRN
Status: DISCONTINUED | OUTPATIENT
Start: 2023-03-16 | End: 2023-03-16 | Stop reason: SDUPTHER

## 2023-03-16 RX ORDER — FENTANYL CITRATE 50 UG/ML
INJECTION, SOLUTION INTRAMUSCULAR; INTRAVENOUS PRN
Status: DISCONTINUED | OUTPATIENT
Start: 2023-03-16 | End: 2023-03-16 | Stop reason: SDUPTHER

## 2023-03-16 RX ORDER — BUPIVACAINE HYDROCHLORIDE 5 MG/ML
INJECTION, SOLUTION EPIDURAL; INTRACAUDAL
Status: COMPLETED | OUTPATIENT
Start: 2023-03-16 | End: 2023-03-16

## 2023-03-16 RX ADMIN — PHENYLEPHRINE HYDROCHLORIDE 200 MCG: 10 INJECTION INTRAVENOUS at 14:31

## 2023-03-16 RX ADMIN — FENTANYL CITRATE 100 MCG: 50 INJECTION, SOLUTION INTRAMUSCULAR; INTRAVENOUS at 13:58

## 2023-03-16 RX ADMIN — SODIUM CHLORIDE, PRESERVATIVE FREE 10 ML: 5 INJECTION INTRAVENOUS at 09:56

## 2023-03-16 RX ADMIN — SUGAMMADEX 200 MG: 100 INJECTION, SOLUTION INTRAVENOUS at 17:06

## 2023-03-16 RX ADMIN — MIDAZOLAM 1 MG: 1 INJECTION INTRAMUSCULAR; INTRAVENOUS at 14:10

## 2023-03-16 RX ADMIN — BUPIVACAINE HYDROCHLORIDE 20 ML: 5 INJECTION, SOLUTION EPIDURAL; INTRACAUDAL; PERINEURAL at 13:50

## 2023-03-16 RX ADMIN — Medication 100 MG: at 14:19

## 2023-03-16 RX ADMIN — PHENYLEPHRINE HYDROCHLORIDE 100 MCG: 10 INJECTION INTRAVENOUS at 14:33

## 2023-03-16 RX ADMIN — ROCURONIUM BROMIDE 10 MG: 10 INJECTION, SOLUTION INTRAVENOUS at 14:54

## 2023-03-16 RX ADMIN — ROCURONIUM BROMIDE 10 MG: 10 INJECTION, SOLUTION INTRAVENOUS at 15:27

## 2023-03-16 RX ADMIN — SODIUM CHLORIDE: 9 INJECTION, SOLUTION INTRAVENOUS at 15:27

## 2023-03-16 RX ADMIN — HYDROCODONE BITARTRATE AND ACETAMINOPHEN 2 TABLET: 5; 325 TABLET ORAL at 10:38

## 2023-03-16 RX ADMIN — ONDANSETRON 4 MG: 2 INJECTION INTRAMUSCULAR; INTRAVENOUS at 17:02

## 2023-03-16 RX ADMIN — PHENYLEPHRINE HYDROCHLORIDE 100 MCG: 10 INJECTION INTRAVENOUS at 14:30

## 2023-03-16 RX ADMIN — MORPHINE SULFATE 4 MG: 4 INJECTION, SOLUTION INTRAMUSCULAR; INTRAVENOUS at 12:10

## 2023-03-16 RX ADMIN — ROCURONIUM BROMIDE 50 MG: 10 INJECTION, SOLUTION INTRAVENOUS at 14:19

## 2023-03-16 RX ADMIN — SODIUM CHLORIDE: 9 INJECTION, SOLUTION INTRAVENOUS at 16:45

## 2023-03-16 RX ADMIN — SODIUM CHLORIDE: 9 INJECTION, SOLUTION INTRAVENOUS at 14:13

## 2023-03-16 RX ADMIN — CEFAZOLIN 2000 MG: 10 INJECTION, POWDER, FOR SOLUTION INTRAVENOUS at 21:52

## 2023-03-16 RX ADMIN — FENTANYL CITRATE 100 MCG: 50 INJECTION, SOLUTION INTRAMUSCULAR; INTRAVENOUS at 14:19

## 2023-03-16 RX ADMIN — Medication 2000 MG: at 14:22

## 2023-03-16 RX ADMIN — MORPHINE SULFATE 4 MG: 4 INJECTION, SOLUTION INTRAMUSCULAR; INTRAVENOUS at 05:38

## 2023-03-16 RX ADMIN — MIDAZOLAM 2 MG: 1 INJECTION INTRAMUSCULAR; INTRAVENOUS at 13:57

## 2023-03-16 RX ADMIN — PHENYLEPHRINE HYDROCHLORIDE 50 MCG/MIN: 10 INJECTION INTRAVENOUS at 14:35

## 2023-03-16 RX ADMIN — PROPOFOL 150 MG: 10 INJECTION, EMULSION INTRAVENOUS at 14:19

## 2023-03-16 RX ADMIN — PHENYLEPHRINE HYDROCHLORIDE 50 MCG: 10 INJECTION INTRAVENOUS at 14:25

## 2023-03-16 ASSESSMENT — PAIN DESCRIPTION - ORIENTATION
ORIENTATION: RIGHT

## 2023-03-16 ASSESSMENT — PAIN DESCRIPTION - DESCRIPTORS
DESCRIPTORS: ACHING
DESCRIPTORS: BURNING

## 2023-03-16 ASSESSMENT — PAIN SCALES - GENERAL
PAINLEVEL_OUTOF10: 7
PAINLEVEL_OUTOF10: 9
PAINLEVEL_OUTOF10: 2
PAINLEVEL_OUTOF10: 10
PAINLEVEL_OUTOF10: 10

## 2023-03-16 ASSESSMENT — LIFESTYLE VARIABLES: SMOKING_STATUS: 1

## 2023-03-16 ASSESSMENT — PAIN DESCRIPTION - LOCATION
LOCATION: SHOULDER
LOCATION: SHOULDER
LOCATION: SHOULDER;ARM
LOCATION: SHOULDER

## 2023-03-16 NOTE — CARE COORDINATION
3/16/23, 1:22 PM EDT    DISCHARGE  [facility] [address] day: 4  Location: -07/007-A Reason for admit: Acute alcoholic intoxication with complication [facility]) [V80.361]  Closed fracture dislocation of right shoulder, initial encounter [S42.91XA]  Traumatic closed displaced fracture of right shoulder with anterior dislocation, initial encounter [S42.91XA]   Procedure: 3/12: Closed reduction of right proximal humerus fracture dislocation  3/16: Pending Rt. Proximal humerus ORIF  Barriers to Discharge: OR later today. PT/OT following, pain management-IV and PO PRN  PCP: No primary care provider on file. Readmission Risk Score: 8.8%  Patient Goals/Plan/Treatment Preferences: Plan to return home with family. Would like new PCP appt with residency clinic (unit clerk notified). Declines additional needs.

## 2023-03-16 NOTE — PROGRESS NOTES
Patient in Pre-Op holding area with family present. Patient verified surgical and anesthesia consents.     Patient arrived to OR with no hearing aides, jewelry, dentures, glasses or clothing    Nasal Swabs: Done prior to start of procedure  Temp: 97.8

## 2023-03-16 NOTE — PROGRESS NOTES
1725: Pt arrives to pacu, awakens to verbal stimuli. Pt on room air, respirations easy and unlabored. VSS  1730: Pt moving in bed and attempting to take leads off. Denies pain  1738: Medical imaging at bedside for portable x-ray  1750: Report given to [facility] on [address]: Pt meets criteria for discharge from pacu, awaiting transport  1800: Pt removing buckle on sling, educated importance of wearing it. Pt refuses to allow it to be rebuckled  0635: Pt transported to [address] [place] [address] in stable condition.  VSS

## 2023-03-16 NOTE — ANESTHESIA PRE PROCEDURE
Department of Anesthesiology  Preprocedure Note       Name:  Rajendra Polk   Age:  71 y.o.  :  1950                                          MRN:  [id number]         Date:  3/16/2023      Surgeon: Mecca Bright):  Jarrod Lopez DO    Procedure: Procedure(s):  RIGHT PROXIMAL HUMERUS ORIF    Medications prior to admission:   Prior to Admission medications    Medication Sig Start Date End Date Taking? Authorizing Provider   cyclobenzaprine (FLEXERIL) 10 MG tablet Take 1 tablet by mouth 3 times daily as needed for Muscle spasms 3/16/23 3/26/23 Yes Miri Augilar PA-C   HYDROcodone-acetaminophen (NORCO) 5-325 MG per tablet Take 1 tablet by mouth every 6 hours as needed for Pain for up to 7 days. Intended supply: 7 days. Take lowest dose possible to manage pain Max Daily Amount: 4 tablets 3/12/23 3/19/23 Yes ROBERT Argueta       Current medications:    Current Facility-Administered Medications   Medication Dose Route Frequency Provider Last Rate Last Admin    ceFAZolin (ANCEF) 2000 mg in 0.9% sodium chloride 50 mL IVPB  2,000 mg IntraVENous 60 Min Pre-Op Sharif Aguilar PA-C        nicotine (NICODERM CQ) 21 MG/24HR 1 patch  1 patch TransDERmal Nightly Poppy Weston PA-C   1 patch at 03/15/23 2010    ondansetron (ZOFRAN-ODT) disintegrating tablet 4 mg  4 mg Oral Q8H PRN ROBERT Argueta        Or    ondansetron [facility]) injection 4 mg  4 mg IntraVENous Q6H PRN ROBERT Argueta        morphine (PF) injection 2 mg  2 mg IntraVENous Q2H PRN ROBERT Argueta        Or    morphine injection 4 mg  4 mg IntraVENous Q2H PRN ROBERT Argueta   4 mg at 23 1210    sodium chloride flush 0.9 % injection 5-40 mL  5-40 mL IntraVENous 2 times per day ROBERT Argueta   10 mL at 23 0956    sodium chloride flush 0.9 % injection 5-40 mL  5-40 mL IntraVENous PRN ROBERT Argueta        0.9 % sodium chloride infusion   IntraVENous PRN ROBERT Argueta        acetaminophen (TYLENOL) tablet 650 mg  650 mg Oral Q4H PRN ROBERT Stiles        HYDROcodone-acetaminophen [facility]) 5-325 MG per tablet 1 tablet  1 tablet Oral Q4H PRN ROBERT Stiles        HYDROcodone-acetaminophen [facility]) 5-325 MG per tablet 2 tablet  2 tablet Oral Q4H PRN ROBERT Stiles   2 tablet at 03/16/23 1038    cyclobenzaprine (FLEXERIL) tablet 10 mg  10 mg Oral TID PRN ROBERT Stiles   10 mg at 03/15/23 2305       Allergies:  No Known Allergies    Problem List:    Patient Active Problem List   Diagnosis Code    Closed fracture dislocation of right shoulder, initial encounter S42. 91XA    Traumatic closed displaced fracture of right shoulder with anterior dislocation, initial encounter S42. 91XA       Past Medical History:  History reviewed. No pertinent past medical history. Past Surgical History:        Procedure Laterality Date    CLAVICLE SURGERY Right 3/12/2023    CLOSED SHOULDER REDUCTION performed by Val Sapp MD at [address] [place] [address] History:    Social History     Tobacco Use    Smoking status: Every Day     Packs/day: 1.00     Types: Cigarettes    Smokeless tobacco: Not on file   Substance Use Topics    Alcohol use:  Yes     Alcohol/week: 12.0 standard drinks     Types: 12 Cans of beer per week     Comment: once every two weeks                                Ready to quit: Not Answered  Counseling given: Not Answered      Vital Signs (Current):   Vitals:    03/16/23 0302 03/16/23 0945 03/16/23 1038 03/16/23 1210   BP: 111/75 131/76     Pulse: (!) 101 (!) 107     Resp: 18 18 20 18   Temp:  98.2 °F (36.8 °C)     TempSrc: Oral Oral     SpO2: 94% 94%     Weight:       Height:                                                  BP Readings from Last 3 Encounters:   03/16/23 131/76       NPO Status: Time of last liquid consumption: 2330                        Time of last solid consumption: 2330                        Date of last liquid consumption: 03/11/23                        Date of last solid food consumption: 03/11/23    BMI:   Wt Readings from Last 3 Encounters:   03/13/23 209 lb 3.5 oz (94.9 kg)     Body mass index is 27.6 kg/m². CBC:   Lab Results   Component Value Date/Time    WBC 5.1 03/15/2023 05:24 AM    RBC 3.93 03/15/2023 05:24 AM    HGB 12.6 03/15/2023 05:24 AM    HCT 37.7 03/15/2023 05:24 AM    MCV 95.9 03/15/2023 05:24 AM     03/15/2023 05:24 AM       CMP:   Lab Results   Component Value Date/Time     03/15/2023 05:24 AM    K 4.1 03/15/2023 05:24 AM    CL 96 03/15/2023 05:24 AM    CO2 26 03/15/2023 05:24 AM    BUN 22 03/15/2023 05:24 AM    CREATININE 1.0 03/15/2023 05:24 AM    LABGLOM >60 03/15/2023 05:24 AM    GLUCOSE 110 03/15/2023 05:24 AM    PROT 7.0 03/12/2023 08:54 AM    CALCIUM 8.3 03/15/2023 05:24 AM    BILITOT 0.3 03/12/2023 08:54 AM    ALKPHOS 85 03/12/2023 08:54 AM    AST 40 03/12/2023 08:54 AM    ALT 44 03/12/2023 08:54 AM       POC Tests: No results for input(s): POCGLU, POCNA, POCK, POCCL, POCBUN, POCHEMO, POCHCT in the last 72 hours. Coags: No results found for: PROTIME, INR, APTT    HCG (If Applicable): No results found for: PREGTESTUR, PREGSERUM, HCG, HCGQUANT     ABGs: No results found for: PHART, PO2ART, JYM0OZJ, JUO5UBT, BEART, T7OWGVAK     Type & Screen (If Applicable):  Lab Results   Component Value Date    LABRH POS 03/12/2023       Drug/Infectious Status (If Applicable):  No results found for: HIV, HEPCAB    COVID-19 Screening (If Applicable): No results found for: COVID19        Anesthesia Evaluation  Patient summary reviewed  Airway: Mallampati: II  TM distance: >3 FB   Neck ROM: full  Mouth opening: > = 3 FB   Dental:    (+) poor dentition      Pulmonary:   (+) current smoker                           Cardiovascular:          ECG reviewed                        Neuro/Psych:                ROS comment: etoh hx GI/Hepatic/Renal:             Endo/Other:                     Abdominal:             Vascular:           Other Findings: Anesthesia Plan      general and regional     ASA 2       Induction: intravenous. MIPS: Postoperative opioids intended and Prophylactic antiemetics administered. Anesthetic plan and risks discussed with patient. Plan discussed with CRNA. Lachelle Frazier.  [address] [place] [address]   3/16/2023

## 2023-03-16 NOTE — ANESTHESIA POSTPROCEDURE EVALUATION
Department of Anesthesiology  Postprocedure Note    Patient: Re Menendez  MRN: [id number]  YOB: 1950  Date of evaluation: 3/16/2023      Procedure Summary     Date: 03/16/23 Room / Location: [address] [facility]    Anesthesia Start: 1085 Anesthesia Stop: 9248    Procedure: RIGHT PROXIMAL HUMERUS ORIF (Right: Arm Upper) Diagnosis:       Closed fracture dislocation of right shoulder, initial encounter      (Closed fracture dislocation of right shoulder, initial encounter Kasandra Crooks)    Surgeons: Raegan Wilhelm DO Responsible Provider: Yovani Priest DO    Anesthesia Type: general, regional ASA Status: 2          Anesthesia Type: No value filed.     Maynor Phase I: Maynor Score: 10    Maynor Phase II:        Anesthesia Post Evaluation    Patient location during evaluation: PACU  Patient participation: complete - patient participated  Level of consciousness: awake (pt is a known heavy ETOH user with mild dementia at baseline. )  Airway patency: patent  Nausea & Vomiting: no vomiting and no nausea  Complications: no  Cardiovascular status: hemodynamically stable  Respiratory status: acceptable  Hydration status: stable

## 2023-03-16 NOTE — ANESTHESIA PROCEDURE NOTES
Peripheral Block    Patient location during procedure: procedure area  Reason for block: procedure for pain, post-op pain management and at surgeon's request  Start time: 3/16/2023 1:50 PM  End time: 3/16/2023 2:00 PM  Staffing  Performed: anesthesiologist   Anesthesiologist: Keisha Palm DO  Preanesthetic Checklist  Completed: patient identified, IV checked, site marked, risks and benefits discussed, surgical/procedural consents, equipment checked, pre-op evaluation, timeout performed, anesthesia consent given, oxygen available and monitors applied/VS acknowledged  Peripheral Block   Patient position: sitting  Prep: ChloraPrep  Provider prep: mask and sterile gloves  Patient monitoring: oxygen, responsive to questions and continuous pulse ox  Block type: Brachial plexus  Interscalene  Laterality: right  Injection technique: single-shot  Guidance: ultrasound guided  Local infiltration: decadron  Local infiltration: decadron    Needle   Needle type: short-bevel   Needle gauge: 20 G  Needle localization: ultrasound guidance  Needle length: 10 cm  Assessment   Injection assessment: negative aspiration for heme, no paresthesia on injection and local visualized surrounding nerve on ultrasound  Paresthesia pain: none  Slow fractionated injection: yes  Hemodynamics: stable  Outcomes: uncomplicated and patient tolerated procedure well    Medications Administered  bupivacaine (PF) 0.5 % - Perineural   20 mL - 3/16/2023 1:50:00 PM  dexamethasone 4 MG/ML - Perineural   4 mg - 3/16/2023 1:50:00 PM

## 2023-03-16 NOTE — PROGRESS NOTES
Orthopaedic Progress Note      SUBJECTIVE   Mr. Sue Flynn is post op day #4 closed reduction R proximal humerus fracture dislocation     Seen at bedside this morning  no adverse events overnight  Pain well controlled, tolerating oral diet  Denies any numbness/paresthesia in operative extremity  Maintained sling, soreness with immobilization but no feelings of instability  Denied n/v, sweating, headache, hallucinations  NPO  Ready for OR      OBJECTIVE      Physical    VITALS:  /75   Pulse (!) 101   Temp 100.4 °F (38 °C) (Oral)   Resp 18   Ht 6' 1\" (1.854 m)   Wt 209 lb 3.5 oz (94.9 kg)   SpO2 94%   BMI 27.60 kg/m²   I/O last 3 completed shifts: In: 320 [P.O.:320]  Out: -     7/10 pain  Gen: alert and oriented, no hallucinations, no tremor, calm, not agitated  Head: normorcephalic, atraumatic  Resp: unlabored, room air  Pelvis: stable  RUE: sling intact, RMU/AIN/PIN/Ax motor intact. RMUAx SILT. Finger F/E intact, composite fist. 2+ radial pulse.       Data  CBC:   Lab Results   Component Value Date/Time    WBC 5.1 03/15/2023 05:24 AM    HGB 12.6 03/15/2023 05:24 AM     03/15/2023 05:24 AM     BMP:    Lab Results   Component Value Date/Time     03/15/2023 05:24 AM    K 4.1 03/15/2023 05:24 AM    CL 96 03/15/2023 05:24 AM    CO2 26 03/15/2023 05:24 AM    BUN 22 03/15/2023 05:24 AM    CREATININE 1.0 03/15/2023 05:24 AM    CALCIUM 8.3 03/15/2023 05:24 AM    GLUCOSE 110 03/15/2023 05:24 AM     Uric Acid:  No components found for: URIC  PT/INR:  No results found for: PROTIME, INR  Troponin:  No results found for: TROPONINI  Urine Culture:  No components found for: CURINE      Current Inpatient Medications    Current Facility-Administered Medications: nicotine (NICODERM CQ) 21 MG/24HR 1 patch, 1 patch, TransDERmal, Nightly  ondansetron (ZOFRAN-ODT) disintegrating tablet 4 mg, 4 mg, Oral, Q8H PRN **OR** ondansetron (ZOFRAN) injection 4 mg, 4 mg, IntraVENous, Q6H PRN  morphine (PF) injection 2 mg, 2 mg, IntraVENous, Q2H PRN **OR** morphine injection 4 mg, 4 mg, IntraVENous, Q2H PRN  sodium chloride flush 0.9 % injection 5-40 mL, 5-40 mL, IntraVENous, 2 times per day  sodium chloride flush 0.9 % injection 5-40 mL, 5-40 mL, IntraVENous, PRN  0.9 % sodium chloride infusion, , IntraVENous, PRN  acetaminophen (TYLENOL) tablet 650 mg, 650 mg, Oral, Q4H PRN  HYDROcodone-acetaminophen (NORCO) 5-325 MG per tablet 1 tablet, 1 tablet, Oral, Q4H PRN  HYDROcodone-acetaminophen (NORCO) 5-325 MG per tablet 2 tablet, 2 tablet, Oral, Q4H PRN  cyclobenzaprine (FLEXERIL) tablet 10 mg, 10 mg, Oral, TID PRN        PLAN    Mr. Clarice Mehta is post op day #4 closed reduction R proximal humerus fracture dislocation     Doing well  Continue to ADAT  NWB RUE, strict sling use  Remains stable on the floor  NPO  Ready for OR with Dr Shelly Edwards today  Surgical consent complete

## 2023-03-16 NOTE — BRIEF OP NOTE
Brief Postoperative Note      Patient: Shu Witt  YOB: 1950  MRN: [id number]    Date of Procedure: 3/16/2023    Pre-Op Diagnosis: Closed fracture dislocation of right shoulder, initial encounter [S42.91XA]    Post-Op Diagnosis:  Right proximal humerus fracture       Procedure(s):  RIGHT PROXIMAL HUMERUS ORIF    Surgeon(s):  Nick Muniz DO    Assistant:  Physician Assistant: Sin Kamara PA-C    Anesthesia: General    Estimated Blood Loss (mL): 990    Complications: None    Specimens:   * No specimens in log *    Implants:  Implant Name Type Inv. Item Serial No.  Lot No. LRB No. Used Action   GRAFT BONE 4-10MM 30ML CANC CUBE - [id number]  GRAFT BONE 4-10MM 30ML CANC CUBE [id number] [employer] SPINALGRA[employer] TECH- [id number] Right 1 Implanted   PLATE BONE [id number] 7 H STRL GREATER TUBEROSITY S STL EVOS - [id number]  PLATE BONE [id number] 7 H STRL GREATER TUBEROSITY S STL EVOS  [facility]  Right 1 Implanted   SCREW BONE L46MM DIA3. 5MM STRL S STL LCK ST FOR SM PLATING - [id number]  SCREW BONE L46MM DIA3. 5MM STRL S STL LCK ST FOR SM PLATING  [facility]  Right 1 Implanted   SCREW BONE L44MM DIA3. 5MM STRL S STL LCK ST FOR SM PLATING - [id number]  SCREW BONE L44MM DIA3. 5MM STRL S STL LCK ST FOR SM PLATING  [facility]  Right 1 Implanted   SCREW BONE L50MM DIA3. 5MM STRL S STL LCK ST FOR SM PLATING - [id number]  SCREW BONE L50MM DIA3. 5MM STRL S STL LCK ST FOR SM PLATING  [facility]  Right 1 Implanted   SCREW BONE L40MM DIA3. 5MM S STL ST LCK FOR EVOS SM PLATING - [id number]  SCREW BONE L40MM DIA3. 5MM S STL ST LCK FOR EVOS SM PLATING  [facility]  Right 1 Implanted   SCREW BONE L32MM DIA3. 5MM STRL DOYLE S STL ST FOR SM PLATING - [id number]  SCREW BONE L32MM DIA3. 5MM STRL DOYLE S STL ST FOR SM PLATING  [employer]  Right 1 Implanted   SCREW BONE L28MM DIA3.5MM DOYLE S STL ST FOR SM PLATING SYS - [id number]  SCREW BONE L28MM DIA3.5MM DOYLE S STL ST FOR SM PLATING SYS  [employer]  Right 1 Implanted   PF PIN    [employer]  Right 1 Implanted         Drains: * No LDAs found *    Findings: Postop diagnosis confirmed    Electronically signed by Leatha Stock PA-C on 3/16/2023 at 5:25 PM

## 2023-03-17 ENCOUNTER — APPOINTMENT (OUTPATIENT)
Dept: GENERAL RADIOLOGY | Age: 73
End: 2023-03-17
Payer: MEDICARE

## 2023-03-17 VITALS
BODY MASS INDEX: 27.73 KG/M2 | WEIGHT: 209.22 LBS | OXYGEN SATURATION: 98 % | SYSTOLIC BLOOD PRESSURE: 135 MMHG | TEMPERATURE: 97.7 F | HEART RATE: 82 BPM | DIASTOLIC BLOOD PRESSURE: 83 MMHG | HEIGHT: 73 IN | RESPIRATION RATE: 16 BRPM

## 2023-03-17 LAB
HCT VFR BLD AUTO: 36.1 % (ref 42–52)
HGB BLD-MCNC: 12.1 GM/DL (ref 14–18)

## 2023-03-17 PROCEDURE — 97116 GAIT TRAINING THERAPY: CPT

## 2023-03-17 PROCEDURE — 97535 SELF CARE MNGMENT TRAINING: CPT

## 2023-03-17 PROCEDURE — 85014 HEMATOCRIT: CPT

## 2023-03-17 PROCEDURE — 2580000003 HC RX 258: Performed by: PHYSICIAN ASSISTANT

## 2023-03-17 PROCEDURE — 97162 PT EVAL MOD COMPLEX 30 MIN: CPT

## 2023-03-17 PROCEDURE — 6360000002 HC RX W HCPCS: Performed by: PHYSICIAN ASSISTANT

## 2023-03-17 PROCEDURE — 97166 OT EVAL MOD COMPLEX 45 MIN: CPT

## 2023-03-17 PROCEDURE — 85018 HEMOGLOBIN: CPT

## 2023-03-17 PROCEDURE — 6370000000 HC RX 637 (ALT 250 FOR IP): Performed by: PHYSICIAN ASSISTANT

## 2023-03-17 PROCEDURE — 73030 X-RAY EXAM OF SHOULDER: CPT

## 2023-03-17 PROCEDURE — 97530 THERAPEUTIC ACTIVITIES: CPT

## 2023-03-17 PROCEDURE — 36415 COLL VENOUS BLD VENIPUNCTURE: CPT

## 2023-03-17 RX ADMIN — HYDROCODONE BITARTRATE AND ACETAMINOPHEN 2 TABLET: 5; 325 TABLET ORAL at 10:26

## 2023-03-17 RX ADMIN — SODIUM CHLORIDE, PRESERVATIVE FREE 10 ML: 5 INJECTION INTRAVENOUS at 08:08

## 2023-03-17 RX ADMIN — HYDROCODONE BITARTRATE AND ACETAMINOPHEN 2 TABLET: 5; 325 TABLET ORAL at 04:15

## 2023-03-17 RX ADMIN — CEFAZOLIN 2000 MG: 10 INJECTION, POWDER, FOR SOLUTION INTRAVENOUS at 05:29

## 2023-03-17 ASSESSMENT — PAIN - FUNCTIONAL ASSESSMENT
PAIN_FUNCTIONAL_ASSESSMENT: PREVENTS OR INTERFERES WITH MANY ACTIVE NOT PASSIVE ACTIVITIES
PAIN_FUNCTIONAL_ASSESSMENT: PREVENTS OR INTERFERES SOME ACTIVE ACTIVITIES AND ADLS
PAIN_FUNCTIONAL_ASSESSMENT: ACTIVITIES ARE NOT PREVENTED

## 2023-03-17 ASSESSMENT — PAIN DESCRIPTION - ONSET
ONSET: ON-GOING
ONSET: ON-GOING

## 2023-03-17 ASSESSMENT — PAIN SCALES - GENERAL
PAINLEVEL_OUTOF10: 8
PAINLEVEL_OUTOF10: 8
PAINLEVEL_OUTOF10: 5
PAINLEVEL_OUTOF10: 4
PAINLEVEL_OUTOF10: 7

## 2023-03-17 ASSESSMENT — PAIN DESCRIPTION - DESCRIPTORS
DESCRIPTORS: ACHING
DESCRIPTORS: ACHING;DISCOMFORT
DESCRIPTORS: ACHING;DISCOMFORT

## 2023-03-17 ASSESSMENT — PAIN DESCRIPTION - LOCATION
LOCATION: SHOULDER
LOCATION: ELBOW
LOCATION: SHOULDER

## 2023-03-17 ASSESSMENT — PAIN DESCRIPTION - ORIENTATION
ORIENTATION: RIGHT

## 2023-03-17 ASSESSMENT — PAIN DESCRIPTION - FREQUENCY
FREQUENCY: INTERMITTENT
FREQUENCY: INTERMITTENT

## 2023-03-17 ASSESSMENT — PAIN DESCRIPTION - PAIN TYPE
TYPE: ACUTE PAIN;SURGICAL PAIN
TYPE: SURGICAL PAIN

## 2023-03-17 NOTE — PLAN OF CARE
Problem: Discharge Planning  Goal: Discharge to home or other facility with appropriate resources  Outcome: Completed     Problem: Pain  Goal: Verbalizes/displays adequate comfort level or baseline comfort level  Outcome: Completed     Problem: Skin/Tissue Integrity  Goal: Absence of new skin breakdown  Description: 1. Monitor for areas of redness and/or skin breakdown  2. Assess vascular access sites hourly  3. Every 4-6 hours minimum:  Change oxygen saturation probe site  4. Every 4-6 hours:  If on nasal continuous positive airway pressure, respiratory therapy assess nares and determine need for appliance change or resting period.   Outcome: Completed     Problem: Safety - Adult  Goal: Free from fall injury  Outcome: Completed     Problem: ABCDS Injury Assessment  Goal: Absence of physical injury  Outcome: Completed     Problem: Skin/Tissue Integrity - Adult  Goal: Skin integrity remains intact  Outcome: Completed  Flowsheets (Taken 3/17/2023 1313)  Skin Integrity Remains Intact: Monitor for areas of redness and/or skin breakdown  Goal: Incisions, wounds, or drain sites healing without S/S of infection  Outcome: Completed     Problem: Musculoskeletal - Adult  Goal: Return mobility to safest level of function  Outcome: Completed  Goal: Maintain proper alignment of affected body part  Outcome: Completed  Goal: Return ADL status to a safe level of function  Outcome: Completed     Problem: Infection - Adult  Goal: Absence of infection during hospitalization  Outcome: Completed     Problem: Nutrition Deficit:  Goal: Optimize nutritional status  Outcome: Completed

## 2023-03-17 NOTE — DISCHARGE INSTR - DIET

## 2023-03-17 NOTE — DISCHARGE SUMMARY
Physician Discharge Summary     Patient ID:  Josefa Dodd  [id number]  71 y.o.  1950    Admit date: 3/12/2023    Discharge date and time: 3/17/23    Admitting Physician: Eric Marte MD     Discharge Physician: Eric Marte MD     Admission Diagnoses: Acute alcoholic intoxication with complication [facility]) [A12.473]  Closed fracture dislocation of right shoulder, initial encounter [S42.91XA]  Traumatic closed displaced fracture of right shoulder with anterior dislocation, initial encounter [S42.91XA]    Discharge Diagnoses: Acute alcoholic intoxication with complication [facility]) [Y74.801]  Closed fracture dislocation of right shoulder, initial encounter [S42.91XA]  Traumatic closed displaced fracture of right shoulder with anterior dislocation, initial encounter [S42.91XA]    Admission Condition: good    Discharged Condition: good    Indication for Admission: unstable orthopaedic injury    Hospital Course: Patient is now s/p ORIF R proximal humerus on 3/16/23 by Dr. Gloria Bliss. Patient presented to the ED on 3/12/23 after sustaining a fall resulting in an unstable shoulder fracture dislocation, taken to OR where successfully reduced. Discussed instability of fracture and necessity of definitive fixation once withdrawal was not of concern. Taken back to OR 3/16/23 with Dr Gloria Bliss for definitive fixation. On the day of surgery, patient was identified in the pre-operative holding area and agreeable to proceed with surgery. Written consent was obtained. Please see operative note for further details of this procedure. Patient received michael-operative antibiotics. Patient recovered in PACU before transfer to a regular nursing floor. Patient was started on tylenol and norco for pain control. On the day of discharge, patient was afebrile with stable vital signs, stable upon physical exam. Patient was discharged with prescriptions for pain. Patient was deemed stable for discharge to home as recommended by PT/OT.  Patient will follow up with Dr Yudi Truong in 2 weeks for first post operative visit. Discharge Exam:  Please see final progress note for appropriate discharge exam    Disposition: Home    In process/preliminary results:  Outstanding Order Results       No orders found from 2/11/2023 to 3/13/2023. Patient Instructions:   Current Discharge Medication List        START taking these medications    Details   cyclobenzaprine (FLEXERIL) 10 MG tablet Take 1 tablet by mouth 3 times daily as needed for Muscle spasms  Qty: 30 tablet, Refills: 0      HYDROcodone-acetaminophen (NORCO) 5-325 MG per tablet Take 1 tablet by mouth every 6 hours as needed for Pain for up to 7 days. Intended supply: 7 days.  Take lowest dose possible to manage pain Max Daily Amount: 4 tablets  Qty: 28 tablet, Refills: 0    Comments: Reduce doses taken as pain becomes manageable  Associated Diagnoses: Closed fracture dislocation of right shoulder, initial encounter           Activity: NWB RUE, maintain sling  Diet: regular diet  Wound Care: keep wound clean and dry    Follow-up with Dr Yudi Truong in 2  weeks     Signed:  Dixie Mckay PA-C    3/17/2023  11:06 AM

## 2023-03-17 NOTE — PROGRESS NOTES
Discharge instructions reviewed with pt at this time and all questions answered. PIV removed without complications. Pt states family is here and will be returning to room soon and will call out when he is ready for transport. No s/sx of distress noted. Will continue to monitor.

## 2023-03-17 NOTE — PROGRESS NOTES
[address] [address]  INPATIENT PHYSICAL THERAPY  EVALUATION  [facility] ORTHOPEDICS 7K - [id number]    Time In: 8241  Time Out: 0089  Timed Code Treatment Minutes: 17 Minutes  Minutes: 27          Date: 3/17/2023  Patient Name: Sendy Newman,  Gender:  male        MRN: [id number]  : 1950  (76 y.o.)      Referring Practitioner: Sin Kamara PA-C  Diagnosis: Acute alcoholic intoxication with complication [facility]  Additional Pertinent Hx: Per EMR \"Saleem Anderson is a 71 y.o. male who presents to the emergency department for evaluation of shoulder pain. The patient presented to the emergency department by EMS. He states that he is a binge drinker and since 11 AM yesterday he was drinking continuously with his friends. He remembers his friends taking him home, and then states that he woke up this morning screaming in pain from his right shoulder. His roommates called 911. The patient does not remember falling or injuring himself. He denies chest pain, shortness of breath, and reports that he was previously healthy. He reports smoking a pack of cigarettes per day, binge drinking, and denies other recreational drug use. \" s/p Closed reduction right proximal humerus fracture dislocation on 3/12 by Cy Wei MD and open reduction internal fixation of right shoulder greater  tuberosity fracture on 3/16 by Jeannie Wiseman DO     Restrictions/Precautions:  Restrictions/Precautions: Fall Risk, Weight Bearing, ROM Restrictions, General Precautions  Right Upper Extremity Weight Bearing: Non Weight Bearing  Required Braces or Orthoses  Right Upper Extremity Brace/Splint: Sling  Position Activity Restriction  Other position/activity restrictions: No active or passive ROM of R shoulder. Subjective:  Chart Reviewed: Yes  Patient assessed for rehabilitation services?: Yes  Family / Caregiver Present: No  Subjective: OK to see pt per nursing. Pt resting in bed and agreeable to PT.  Pt requested PT to adjust sling, which improved his pain from a 8-9/10 to 5/10. Pt also requesting new shorts donned as his current shorts are wet from perspirating. Patient was educated to continue to wear sling and avoid ROM of R shoulder. Patient reports willingness to utilize cane with amb upon discharge. Patient is pleasant, cooperative, and motivated to participate. He notes his daughter is supposed to be bringing his glasses to the hospital today.    General:  Overall Orientation Status: Within Normal Limits  Orientation Level: Oriented X4  Overall Cognitive Status: WNL  Vision: Impaired  Vision Exceptions: Wears glasses at all times  Hearing: Within functional limits       Pain: 8-9/10: at rest upon arrival, 5/10 after sling was re-adjusted by PT    Vitals: Vitals not assessed per clinical judgement, see nursing flowsheet    Social/Functional History:    Lives With:  (Nephew and wife)  Type of Home: House  Home Layout: One level  Home Access: Level entry  Home Equipment: None     Bathroom Shower/Tub: Tub/Shower unit  Bathroom Toilet: Standard  Bathroom Equipment: Grab bars in shower       ADL Assistance: Independent  Homemaking Assistance: Independent  Ambulation Assistance: Independent  Transfer Assistance: Independent    Active : Yes  Mode of Transportation: Truck  Occupation: Retired  Type of Occupation: works Cattle  Additional Comments: Patient amb with no AD prior to admission. Pt expresses willingness to use cane after discharge.    OBJECTIVE:  Range of Motion:  Bilateral Lower Extremity: WFL    Strength:  Bilateral Lower Extremity: Impaired - Grossly deconditioned    Balance:  Static Sitting Balance:  Supervision  Dynamic Sitting Balance: Stand By Assistance  Static Standing Balance: Contact Guard Assistance, Minimal Assistance  Dynamic Standing Balance: Contact Guard Assistance, Minimal Assistance, with cues for safety  Dynamic sitting assessed while patient donned gown, some assistance from PT needed as he only has use of  one UE currently. Patient demonstrates impulsiveness to stand from sitting, and decreased safety awareness and unsteadiness upon standing. Patient donned shorts in sitting and standing independently, requesting assistance from PT to tie the shorts. Patient performed standing balance better once given a cane to aide his balance and improve safety. Bed Mobility:  Supine to Sit: Supervision, with head of bed raised, with increased time for completion    Transfers:  Sit to Stand: Contact Guard Assistance, Minimal Assistance, with increased time for completion, with verbal cues, to/from chair without arms  Stand to [facility] 68, to/from chair with arms  Cues needed for safety as patient is unsteady and impulsive with transfers. Transfers were completed without an AD. Patient reports he thought he was steady on his feet, however upon standing he notes he was more unsteady than he thought. Ambulation:  Contact Guard Assistance, with cues for safety, with verbal cues , with increased time for completion  Distance: 220 ft, 230 ft  Surface: Level Tile  Device: 220 ft without AD, 230 ft with cane  Gait Deviations: Forward Flexed Posture, Slow Marjorie, Decreased Step Length Bilaterally, Decreased Gait Speed, Mild Path Deviations, and Unsteady Gait  Patient ambulated without a AD initially. Patient demonstrated a consistently unsteady gait. Patient was aware he was unsteady at this time, and requested to trial a cane. Pt required cueing for sequencing the AD correctly, and reports willingness to continue amb with a cane at home. Patient was notable short of breath, but denies any symptoms. Functional Outcome Measures: Completed  -PAC Inpatient Mobility without Stair Climbing Raw Score : 14  -PAC Inpatient without Stair Climbing T-Scale Score : 40.85    ASSESSMENT:  Activity Tolerance:  Patient tolerance of  treatment: good.        Treatment Initiated: Treatment and education initiated within context of evaluation. Evaluation time included review of current medical information, gathering information related to past medical, social and functional history, completion of standardized testing, formal and informal observation of tasks, assessment of data and development of plan of care and goals. Treatment time included skilled education and facilitation of tasks to increase safety and independence with functional mobility for improved independence and quality of life. Assessment: Body Structures, Functions, Activity Limitations Requiring Skilled Therapeutic Intervention: Decreased functional mobility , Decreased ADL status, Decreased ROM, Decreased body mechanics, Decreased strength, Decreased safe awareness, Decreased endurance, Increased pain, Decreased posture, Decreased balance  Assessment: Garth Young is a 71 y.o. male who presents with the deficits stated previously. Pt requires 1 person assist for functional tasks with use of cane for support. Pt cont to require skilled PT services to increase IND with functional tasks and progress towards PLOF to return to home environment safely. Therapy Prognosis: Good  Requires PT Follow-Up: Yes    Discharge Recommendations:  Discharge Recommendations: Continue to assess pending progress, Patient would benefit from continued therapy after discharge, Therapy recommended at discharge, 24 hour supervision or assist, Home with Home health PT    Patient Education:      . Patient Education  Education Given To: Patient  Education Provided: Role of Therapy, Plan of Care, Precautions, Equipment, Fall Prevention Strategies, Transfer Training, Energy Conservation  Education Method: Verbal  Barriers to Learning: Vision, Cognition (Patient has decreased safety awareness with mobility.  Vision is affected as patient's glasses are at home.)  Education Outcome: Verbalized understanding, Demonstrated understanding, Continued education needed       Equipment Recommendations:  Equipment Needed: Yes  Mobility Devices: Canes  Cane: Straight Cane    Plan:  Current Treatment Recommendations: Strengthening, ROM, Balance training, Functional mobility training, Transfer training, Endurance training, Neuromuscular re-education, Gait training, Home exercise program, Safety education & training, Patient/Caregiver education & training, Equipment evaluation, education, & procurement, Therapeutic activities  General Plan:  (6x O)    Goals:  Patient Goals : \"To be able to walk out the door\"  Short Term Goals  Time Frame for Short Term Goals: By discharge  Short Term Goal 1: Pt will amb 200 ft with S and SPC for safety within the community  Short Term Goal 2: Pt will perform supine to/from sit transfer independently to return home  Short Term Goal 3: Pt will perform sit to/from stand transfer with S and SPC for safety within home  Long Term Goals  Time Frame for Long Term Goals : NA due to ELOS    Following session, patient left in safe position with all fall risk precautions in place. Pt in bedside chair following session, all needs and call light in reach, alarm on.

## 2023-03-17 NOTE — PROGRESS NOTES
Zoran Valentino 61  INPATIENT OCCUPATIONAL THERAPY  [facility] ORTHOPEDICS 7K  EVALUATION    Time:   Time In:   Time Out: 0908  Timed Code Treatment Minutes: 23 Minutes  Minutes: 32          Date: 3/17/2023  Patient Name: Eliel Summers,   Gender: male      MRN: [id number]  : 1950  (71 y.o.)  Referring Practitioner: Sin Kamara PA-C  Diagnosis: Acute alcoholic intoxication with complication  Additional Pertinent Hx: Per EMR, \"The patient is a 71 y.o. male  who presents with acute onset of right shoulder pain with deformity with unknown mechanism of injury. Patient reports that he began drinking early in the day on 3/11/2023 and had drank heavily throughout the day. He denies any known trauma or injury but believes something had happened to the right shoulder sometime before midnight. He awoke the next day with severe pain in the right shoulder and noted deformity. Brought via EMS to [facility] emergency department. X-ray imaging demonstrated an anteriorly dislocated right shoulder with fracture involving right proximal humerus. Emergency room staff attempted reduction in the ED and was able to reduce the shoulder however it was rather unstable and would subsequently subluxate/dislocate repeatedly. Patient endorsing severe right shoulder pain. Denies any numbness or tingling. We were asked to evaluate. \" Pt is s/p RIGHT PROXIMAL HUMERUS ORIF on 3/16/23 by Dr. Vita Mackey. Restrictions/Precautions:  Restrictions/Precautions: Fall Risk, Weight Bearing, ROM Restrictions, General Precautions  Right Upper Extremity Weight Bearing: Non Weight Bearing  Required Braces or Orthoses  Right Upper Extremity Brace/Splint: Sling  Position Activity Restriction  Other position/activity restrictions: No active or passive ROM of R shoulder.     Subjective  Chart Reviewed: Yes, Orders, Progress Notes, History and Physical, Imaging, Operative Notes  Patient assessed for rehabilitation services?: Yes    Subjective: RN okayed OT session. Upon arrival patient was standing in room with telesitter going off. Pt was agreeable to OT session after being educated on OT Role. Pain: 7/10: R UE    Vitals: Vitals not assessed per clinical judgement, see nursing flowsheet    Social/Functional History:  Lives With:  (Nephew and wife)  Type of Home: House  Home Layout: One level  Home Access: Level entry  Home Equipment: None   Bathroom Shower/Tub: Tub/Shower unit  Bathroom Toilet: Standard  Bathroom Equipment: Grab bars in shower       ADL Assistance: Independent  Homemaking Assistance: Independent  Ambulation Assistance: Independent  Transfer Assistance: Independent    Active : Yes  Mode of Transportation: Truck  Occupation: Retired  Type of Occupation: works Cattle  Additional Comments: Patient amb with no AD prior to admission. Pt expresses willingness to use cane after discharge. VISION:WFL    HEARING:  WFL    COGNITION: Decreased Insight, Decreased Problem Solving, and Decreased Safety Awareness    RANGE OF MOTION:  Right Upper Extremity: Shoulder Not Tested, Elbow PROM WFL, Wrist AROM WFL  Left Upper Extremity:  WFL    Pt is very guarded with RUE. Encouraged to use R wrist and hand. STRENGTH:  Right Upper Extremity: Not Tested  Left Upper Extremity:  WFL    SENSATION:   WFL    ADL:   Upper Extremity Dressing: Maximum Assistance. To don/doff sling and hospital gown while seated EOB. Pt required step by step cues on proper technique. Pt educated to don RUE 1st x 2 . BALANCE:  Sitting Balance:  Stand By Assistance. Standing Balance: Contact Guard Assistance. With cane in LUE. BED MOBILITY:  Sit to Supine: Contact Guard Assistance    Scooting: Stand By Assistance      TRANSFERS:  Sit to Stand:  Contact Guard Assistance. Stand to Sit: Contact Guard Assistance. FUNCTIONAL MOBILITY:  Assistive Device: Straight Cane  Assist Level:  Contact Guard Assistance.    Distance:  around unit  Unsteady x 2 LOB, slow pace,        Activity Tolerance:  Patient tolerance of  treatment: Fair treatment tolerance      Assessment: This 71year old male presents with closed fracture dislocation of right shoulder. Pt demonstrates weakness, decreased balance, decrease safety awareness, decreased endurance and RUE distal AROM. Pt requires skilled OT intervention to increase indep and safety with all self cares, transfers, mobility, and IADLs to return to PLOF. Without skilled OT intervention patient is at increased risk for falls, caregiver burden, and hospital readmission after discharge. Pt would benefit from OT at discharge. Performance deficits / Impairments: Decreased functional mobility , Decreased strength, Decreased endurance, Decreased ADL status, Decreased safe awareness, Decreased balance, Decreased high-level IADLs, Decreased ROM  Prognosis: Good  REQUIRES OT FOLLOW-UP: Yes    Treatment Initiated: Treatment and education initiated within context of evaluation. Evaluation time included review of current medical information, gathering information related to past medical, social and functional history, completion of standardized testing, formal and informal observation of tasks, assessment of data and development of plan of care and goals. Treatment time included skilled education and facilitation of tasks to increase safety and independence with ADL's for improved functional independence and quality of life.     Discharge Recommendations:  Continue to assess pending progress, 24 hour supervision or assist, Patient would benefit from continued therapy after discharge    Patient Education:     Patient Education  Education Given To: Patient  Education Provided: Role of Therapy, Plan of Care, ADL Adaptive Strategies, Transfer Training  Education Method: Demonstration, Verbal  Barriers to Learning: Cognition  Education Outcome: Continued education needed    Equipment Recommendations:  Equipment Needed: No    Plan:  Times Per Week: 6x  Times Per Day: Once a day  Current Treatment Recommendations: Strengthening, ROM, Balance training, Functional mobility training, Endurance training, Patient/Caregiver education & training, Equipment evaluation, education, & procurement, Self-Care / ADL, Home management training, Safety education & training. See long-term goal time frame for expected duration of plan of care. If no long-term goals established, a short length of stay is anticipated. Goals:  Patient goals : Go Home  Short Term Goals  Time Frame for Short Term Goals: Until discharge  Short Term Goal 1: Pt will complete RUE gentle elbow, wrist, and hand AROM and LUE strengthening exercises with min vcs for technique to increase indep with self cares. Short Term Goal 2: Pt will complete dynamic standing task x 5 minutes with 2 UE release and S to increase indep and endurance with all sinkside grooming. Short Term Goal 3: Pt will complete functional mobility to/from BR and HH distances with S and min vcs for safety to increase indep and endurance with toileting. Short Term Goal 4: Pt will complete UB dressing with min A and min vcs for safety to increase indep and endurance in home environment. Short Term Goal 5: Pt will complete LB dressing with Min A and min vcs for safety to increase indep in home environment. Additional Goals?: No         Following session, patient left in safe position with all fall risk precautions in place.

## 2023-03-17 NOTE — OP NOTE
[address] [place] [address]                                OPERATIVE REPORT    PATIENT NAME: Tyrell Caba                        :        1950  MED REC NO:   [id number]                           ROOM:       0007  ACCOUNT NO:   [de-identified]                           ADMIT DATE: 2023  PROVIDER:     Malcom Delong D.O.    DATE OF PROCEDURE:  2023    PREOPERATIVE DIAGNOSIS:  Right shoulder displaced greater tuberosity  fracture, status post closed reduction of proximal humerus fracture  dislocation. POSTOPERATIVE DIAGNOSIS:  Right shoulder displaced greater tuberosity  fracture, status post closed reduction of proximal humerus fracture  dislocation. OPERATION:  Open reduction internal fixation of right shoulder greater  tuberosity fracture. SURGEON:  Malcom Delong DO    ASSISTANT:  Sin Kamara PA-C, who assisted with positioning,  retraction, closure, and dressing application. TYPE OF ANESTHESIA:  General regional.    ESTIMATED BLOOD LOSS:  150 mL. IMPLANTS:  Smith and Nephew 7-hole greater tuberosity plate. INDICATION FOR OPERATION:  The patient is a 79-year-old male who  presented to the ER after sustaining an injury to his shoulder. Not  clear how he sustained it then. He was intoxicated at the time. He  underwent closed reduction of a proximal humerus fracture dislocation by  Dr. Vita Mackey. He was transferred to me for definitive management. He  continued to have a displaced greater tuberosity fracture after  reduction. I recommended ORIF. Risks, benefits, and alternative were  reviewed. The patient elected to proceed. OPERATIVE SUMMARY:  The patient was met in the preop holding area and  correct extremity was identified and marked. Informed consent was  obtained. A nerve block was performed per the Anesthesia team.  The  patient was escorted to the operative suite.   He was prepped and draped  in standard surgical fashion. Time-out was taken. Correct patient,  procedure and operative site were agreed upon by all present. I made an  incision over the anterolateral aspect of the acromion over the anterior  middle thirds of the deltoid. Then the Bovie dissection was carried  down to the fascia. I performed a deltoid splitting approach. This  extended distally just past the level of the axillary nerve. I  identified the axillary nerve. A small window was created just distally  to the nerve to get the most distal screws and the plate. The fracture  site was identified and debrided of hematoma. The wound was copiously  irrigated. After mobilizing the fracture fragment, sutures were placed  into the bone tendon junction. The superior greater tuberosity portion  was comminuted. There was a large triangular bite that extended  distally. Once I had adequate mobilization, the fragment was pinned  into position with an excellent direct visualization of the reduction. There was still a bone void anteriorly after reduction. This was filled  with approximately 20 mL of crushed cancellous bone graft. Smith and  Nephew hook plate was then placed over my K-wires and temporarily held  in position with K-wires and a _____. Fluoroscopy confirmed appropriate  hardware position and acceptable fracture reduction. I then placed  non-locking screws in the distal two holes followed by locking screws in  the more proximal holes. Sutures were covered and then tied to the  plate. Final fluoroscopy confirmed appropriate hardware position and  near anatomic fracture reduction. Wound was copiously irrigated. The  incision was closed with 0 Vicryl, 2-0 Vicryl, 3-0 Monocryl subcuticular  fashion. Sterile dressing was applied. The patient was awakened from  anesthesia. There were no complications. He was transferred to the  PACU in stable condition.   He will be admitted back to the floor for  pain control and medical management. Plan to discharge home on  postoperative day #1 if stable. Sin Kamara, DIAMOND, assisted throughout the procedure with  positioning, draping, retraction, wound closure, dressing, and splint  application.         Emely Ngo D.O.    D: 03/16/2023 17:50:19       T: 03/16/2023 17:55:16     [id number]  Job#: [id number]     Doc#: [id number]    CC:

## 2023-03-17 NOTE — PROGRESS NOTES
Orthopaedic Progress Note      SUBJECTIVE   Mr. Clarice Mehta is post op day # 1 ORIF R greater tuberosity     Seen at bedside this morning  no adverse events overnight  Pain well controlled, tolerating oral diet  Denies any numbness/paresthesia in operative extremity      OBJECTIVE      Physical    VITALS:  /87   Pulse 95   Temp 98 °F (36.7 °C) (Oral)   Resp 18   Ht 6' 1\" (1.854 m)   Wt 209 lb 3.5 oz (94.9 kg)   SpO2 98%   BMI 27.60 kg/m²   I/O last 3 completed shifts: In: 3118.4 [P.O.:570; I.V.:2498.4; IV Piggyback:50]  Out: 750 [Urine:600; Blood:150]    5/10 pain  Gen: alert and oriented  Head: normorcephalic, atraumatic  Resp: unlabored, room air  Pelvis: stable  RUE: Dressing CDI. Sling intact. RMU/AIN/PIN/Ax motor intact. RMUAx SILT. Finger F/E intact, composite fist. 2+ radial pulse.         Data  CBC:   Lab Results   Component Value Date/Time    WBC 5.1 03/15/2023 05:24 AM    HGB 12.6 03/15/2023 05:24 AM     03/15/2023 05:24 AM     BMP:    Lab Results   Component Value Date/Time     03/15/2023 05:24 AM    K 4.1 03/15/2023 05:24 AM    CL 96 03/15/2023 05:24 AM    CO2 26 03/15/2023 05:24 AM    BUN 22 03/15/2023 05:24 AM    CREATININE 1.0 03/15/2023 05:24 AM    CALCIUM 8.3 03/15/2023 05:24 AM    GLUCOSE 110 03/15/2023 05:24 AM     Uric Acid:  No components found for: URIC  PT/INR:  No results found for: PROTIME, INR  Troponin:  No results found for: TROPONINI  Urine Culture:  No components found for: CURINE      Current Inpatient Medications    Current Facility-Administered Medications: ceFAZolin (ANCEF) 2000 mg in 0.9% sodium chloride 50 mL IVPB, 2,000 mg, IntraVENous, 60 Min Pre-Op  nicotine (NICODERM CQ) 21 MG/24HR 1 patch, 1 patch, TransDERmal, Nightly  ondansetron (ZOFRAN-ODT) disintegrating tablet 4 mg, 4 mg, Oral, Q8H PRN **OR** ondansetron (ZOFRAN) injection 4 mg, 4 mg, IntraVENous, Q6H PRN  morphine (PF) injection 2 mg, 2 mg, IntraVENous, Q2H PRN **OR** morphine injection 4 mg, 4 mg, IntraVENous, Q2H PRN  sodium chloride flush 0.9 % injection 5-40 mL, 5-40 mL, IntraVENous, 2 times per day  sodium chloride flush 0.9 % injection 5-40 mL, 5-40 mL, IntraVENous, PRN  0.9 % sodium chloride infusion, , IntraVENous, PRN  acetaminophen (TYLENOL) tablet 650 mg, 650 mg, Oral, Q4H PRN  HYDROcodone-acetaminophen (NORCO) 5-325 MG per tablet 1 tablet, 1 tablet, Oral, Q4H PRN  HYDROcodone-acetaminophen (NORCO) 5-325 MG per tablet 2 tablet, 2 tablet, Oral, Q4H PRN  cyclobenzaprine (FLEXERIL) tablet 10 mg, 10 mg, Oral, TID PRN        PLAN    Mr. Alanis Wade is post op day # 1 ORIF R greater tuberosity     Doing well  Continue to ADAT  Will repeat R shoulder XR, stable  Hgb hct stable  Strict NWB RUE, maintain sling  Dispo- d/c home, 2 weeks with Dr Stefany Jose

## 2023-03-17 NOTE — PROGRESS NOTES
Jasen Hale requires a cane due to impaired ambulation and for increased stability in order to participate in or complete daily living tasks of: toileting, personal cares, and ambulating. The patient is able to safely use the cane and the functional mobility deficit can be sufficiently resolved.

## 2023-03-17 NOTE — CARE COORDINATION
3/17/23, 8:22 AM EDT    [id number] day: 5  Location: [facility]07/007-A Reason for admit: Acute alcoholic intoxication with complication [facility]) [N22.705]  Closed fracture dislocation of right shoulder, initial encounter [S42.91XA]  Traumatic closed displaced fracture of right shoulder with anterior dislocation, initial encounter Deanne Goldmann   Procedure: 3/12: Closed reduction of right proximal humerus fracture dislocation  3/16: RIGHT PROXIMAL HUMERUS ORIF  Barriers to Discharge: working with PT/OT, pain control  PCP: No primary care provider on file. Readmission Risk Score: 9.8%  Patient Goals/Plan/Treatment Preferences: Spoke with pt. He plans to return home with niece and nephew. Does not feel the need for [place] as just finished working with therapy and felt steady. Would like single base cane at discharge to assist with balance. Will contact [employer]ME per pt request    Muriel Bach requires a cane due to impaired ambulation and for increased stability in order to participate in or complete daily living tasks of: ambulating. The patient is able to safely use the cane and the functional mobility deficit can be sufficiently resolved. POTENTIAL WEEKEND DISCHARGE  3/17/23, 8:25 AM EDT    Patient goals/plan/ treatment preferences discussed by  and . Patient goals/plan/ treatment preferences reviewed with patient/ family. Patient/ family verbalize understanding of discharge plan and are in agreement with goal/plan/treatment preferences. Understanding was demonstrated using the teach back method. AVS provided by RN at time of discharge, which includes all necessary medical information pertaining to the patients current course of illness, treatment, post-discharge goals of care, and treatment preferences.      Services At/After Discharge: DME       IMM Letter  IMM Letter given to Patient/Family/Significant other/Guardian/POA/by[de-identified] EV Lenz  IMM Letter date given[de-identified] 03/17/23  IMM Letter time given[de-identified] 0800

## 2023-03-17 NOTE — PLAN OF CARE
Problem: Discharge Planning  Goal: Discharge to home or other facility with appropriate resources  Outcome: Progressing  Flowsheets (Taken 3/16/2023 2216)  Discharge to home or other facility with appropriate resources:   Identify barriers to discharge with patient and caregiver   Arrange for needed discharge resources and transportation as appropriate   Identify discharge learning needs (meds, wound care, etc)  Note: Patient plans home with family at discharge. Care manager and social working helping with discharge needs. Problem: Pain  Goal: Verbalizes/displays adequate comfort level or baseline comfort level  Outcome: Progressing  Flowsheets (Taken 3/16/2023 2216)  Verbalizes/displays adequate comfort level or baseline comfort level:   Encourage patient to monitor pain and request assistance   Assess pain using appropriate pain scale   Administer analgesics based on type and severity of pain and evaluate response   Implement non-pharmacological measures as appropriate and evaluate response  Note: Patient taking pain medication on MAR as needed to control pain. Use of non-pharmacologic pain management including repositioning. Patient pain goal is 2. Problem: Skin/Tissue Integrity  Goal: Absence of new skin breakdown  Description: 1. Monitor for areas of redness and/or skin breakdown  2. Assess vascular access sites hourly  3. Every 4-6 hours minimum:  Change oxygen saturation probe site  4. Every 4-6 hours:  If on nasal continuous positive airway pressure, respiratory therapy assess nares and determine need for appliance change or resting period. Outcome: Progressing  Note: Assess skin every 4 hours     Problem: Safety - Adult  Goal: Free from fall injury  Outcome: Progressing  Flowsheets (Taken 3/16/2023 2216)  Free From Fall Injury: Instruct family/caregiver on patient safety  Note: Patient up with staff assistance. Telesitter at bedside. Patient has remained free of falls during this shift. Appropriate fall prevention measures in place. Problem: ABCDS Injury Assessment  Goal: Absence of physical injury  Outcome: Progressing  Flowsheets (Taken 3/16/2023 2216)  Absence of Physical Injury: Implement safety measures based on patient assessment  Note: Patient up with staff assistance. Telesitter at bedside. Patient has remained free of falls during this shift. Appropriate fall prevention measures in place.         Problem: Skin/Tissue Integrity - Adult  Goal: Skin integrity remains intact  Outcome: Progressing  Flowsheets (Taken 3/16/2023 2216)  Skin Integrity Remains Intact:   Monitor for areas of redness and/or skin breakdown   Assess vascular access sites hourly     Problem: Skin/Tissue Integrity - Adult  Goal: Incisions, wounds, or drain sites healing without S/S of infection  Outcome: Progressing  Flowsheets (Taken 3/16/2023 2216)  Incisions, Wounds, or Drain Sites Healing Without Sign and Symptoms of Infection:   ADMISSION and DAILY: Assess and document risk factors for pressure ulcer development   TWICE DAILY: Assess and document skin integrity     Problem: Musculoskeletal - Adult  Goal: Return mobility to safest level of function  Outcome: Progressing  Flowsheets (Taken 3/16/2023 2216)  Return Mobility to Safest Level of Function:   Assess patient stability and activity tolerance for standing, transferring and ambulating with or without assistive devices   Assist with transfers and ambulation using safe patient handling equipment as needed   Ensure adequate protection for wounds/incisions during mobilization     Problem: Musculoskeletal - Adult  Goal: Return ADL status to a safe level of function  Outcome: Progressing  Flowsheets (Taken 3/16/2023 2216)  Return ADL Status to a Safe Level of Function:   Administer medication as ordered   Assess activities of daily living deficits and provide assistive devices as needed     Problem: Infection - Adult  Goal: Absence of infection during hospitalization  Outcome: Progressing  Flowsheets (Taken 3/16/2023 2216)  Absence of infection during hospitalization:   Assess and monitor for signs and symptoms of infection   Monitor lab/diagnostic results   Monitor all insertion sites i.e., indwelling lines, tubes and drains     Problem: Nutrition Deficit:  Goal: Optimize nutritional status  Outcome: Progressing  Flowsheets (Taken 3/16/2023 2216)  Nutrient intake appropriate for improving, restoring, or maintaining nutritional needs: Assess nutritional status and recommend course of action     Care plan reviewed with patient. Patient verbalized understanding of the plan of care and contribute to goal setting.

## 2023-03-22 ENCOUNTER — OFFICE VISIT (OUTPATIENT)
Dept: FAMILY MEDICINE CLINIC | Age: 73
End: 2023-03-22

## 2023-03-22 VITALS
BODY MASS INDEX: 25.98 KG/M2 | DIASTOLIC BLOOD PRESSURE: 70 MMHG | HEIGHT: 73 IN | WEIGHT: 196 LBS | SYSTOLIC BLOOD PRESSURE: 126 MMHG | HEART RATE: 106 BPM | OXYGEN SATURATION: 99 % | TEMPERATURE: 97.2 F

## 2023-03-22 DIAGNOSIS — Z13.29 SCREENING FOR THYROID DISORDER: ICD-10-CM

## 2023-03-22 DIAGNOSIS — Z09 HOSPITAL DISCHARGE FOLLOW-UP: Primary | ICD-10-CM

## 2023-03-22 DIAGNOSIS — R73.9 HYPERGLYCEMIA: ICD-10-CM

## 2023-03-22 DIAGNOSIS — Z13.220 SCREENING FOR LIPID DISORDERS: ICD-10-CM

## 2023-03-22 DIAGNOSIS — S42.91XD: ICD-10-CM

## 2023-03-22 DIAGNOSIS — D64.9 ANEMIA, UNSPECIFIED TYPE: ICD-10-CM

## 2023-03-22 SDOH — ECONOMIC STABILITY: FOOD INSECURITY: WITHIN THE PAST 12 MONTHS, THE FOOD YOU BOUGHT JUST DIDN'T LAST AND YOU DIDN'T HAVE MONEY TO GET MORE.: NEVER TRUE

## 2023-03-22 SDOH — ECONOMIC STABILITY: HOUSING INSECURITY
IN THE LAST 12 MONTHS, WAS THERE A TIME WHEN YOU DID NOT HAVE A STEADY PLACE TO SLEEP OR SLEPT IN A SHELTER (INCLUDING NOW)?: NO

## 2023-03-22 SDOH — ECONOMIC STABILITY: FOOD INSECURITY: WITHIN THE PAST 12 MONTHS, YOU WORRIED THAT YOUR FOOD WOULD RUN OUT BEFORE YOU GOT MONEY TO BUY MORE.: NEVER TRUE

## 2023-03-22 SDOH — ECONOMIC STABILITY: INCOME INSECURITY: HOW HARD IS IT FOR YOU TO PAY FOR THE VERY BASICS LIKE FOOD, HOUSING, MEDICAL CARE, AND HEATING?: HARD

## 2023-03-22 ASSESSMENT — ENCOUNTER SYMPTOMS
DIARRHEA: 0
VOMITING: 0
ABDOMINAL PAIN: 0
SHORTNESS OF BREATH: 0
NAUSEA: 0
CONSTIPATION: 0

## 2023-03-22 ASSESSMENT — PATIENT HEALTH QUESTIONNAIRE - PHQ9
SUM OF ALL RESPONSES TO PHQ9 QUESTIONS 1 & 2: 2
SUM OF ALL RESPONSES TO PHQ QUESTIONS 1-9: 2
1. LITTLE INTEREST OR PLEASURE IN DOING THINGS: 1
2. FEELING DOWN, DEPRESSED OR HOPELESS: 1
SUM OF ALL RESPONSES TO PHQ QUESTIONS 1-9: 2

## 2023-03-22 NOTE — PROGRESS NOTES
. PRECEPTOR NOTE:    S: 71 y.o. male with   Chief Complaint   Patient presents with    New Patient     HFU, fracture & dislocation of right shoulder       HPI: please see resident note for HPI and ROS. Hospital f/u, alcohol intoxication and had a fall. S/p ORIF R proximal humerus w/ Dr. Padma Estrella. Still taking norco and flexeril. Arm feels weaker, intact strength and sensation. BP Readings from Last 3 Encounters:   03/22/23 126/70   03/17/23 135/83     Wt Readings from Last 3 Encounters:   03/22/23 196 lb (88.9 kg)   03/13/23 209 lb 3.5 oz (94.9 kg)       O: VS:  height is 6' 1\" (1.854 m) and weight is 196 lb (88.9 kg). His temporal temperature is 97.2 °F (36.2 °C). His blood pressure is 126/70 and his pulse is 106 (abnormal). His oxygen saturation is 99%. AAO/NAD, appropriate affect for mood  CV:  RRR, no murmur  Resp: CTAB    Please see resident's note for complete physical exam     Diagnosis Orders   1. Hospital discharge follow-up            Plan:  Please refer to resident note for full plan. F/u with [facility] on 3/31  Repeat CBC and additional labs, has never seen a PCP previously, anemia noted on hospital CBC on discharge   Address routine preventative care in future appointment        No follow-ups on file. Health Maintenance Due   Topic Date Due    COVID-19 Vaccine (1) Never done    Pneumococcal 65+ years Vaccine (1 - PCV) Never done    Depression Screen  Never done    Hepatitis C screen  Never done    DTaP/Tdap/Td vaccine (1 - Tdap) Never done    Lipids  Never done    Colorectal Cancer Screen  Never done    Shingles vaccine (1 of 2) Never done    AAA screen  Never done    Flu vaccine (1) Never done    Annual Wellness Visit (AWV)  03/12/2023     I have discussed the case, including pertinent history and exam findings with the resident and attending physician. I agree with the documented assessment and plan as documented by the resident.       Ej Tena DO 3/22/2023 2:05 PM
S: 71 y.o. male with   Chief Complaint   Patient presents with    New Patient     HFU, fracture & dislocation of right shoulder         Reviewed hx and ROS in detail with resident prior to exam.  See notes for additional details. BP Readings from Last 3 Encounters:   03/22/23 126/70   03/17/23 135/83     Wt Readings from Last 3 Encounters:   03/22/23 196 lb (88.9 kg)   03/13/23 209 lb 3.5 oz (94.9 kg)           O: VS:  height is 6' 1\" (1.854 m) and weight is 196 lb (88.9 kg). His temporal temperature is 97.2 °F (36.2 °C). His blood pressure is 126/70 and his pulse is 106 (abnormal). His oxygen saturation is 99%. AAO/NAD, appropriate affect for mood         Diagnosis Orders   1. Hospital discharge follow-up        2. Screening for diabetes mellitus        3. Screening for lipid disorders        4. Screening for thyroid disorder        5. Anemia, unspecified type            Plan        Health Maintenance Due   Topic Date Due    COVID-19 Vaccine (1) Never done    Pneumococcal 65+ years Vaccine (1 - PCV) Never done    Depression Screen  Never done    Hepatitis C screen  Never done    DTaP/Tdap/Td vaccine (1 - Tdap) Never done    Lipids  Never done    Colorectal Cancer Screen  Never done    Shingles vaccine (1 of 2) Never done    AAA screen  Never done    Flu vaccine (1) Never done    Annual Wellness Visit (AWV)  03/12/2023         Attending Physician Statement  I have discussed the case, including pertinent history and exam findings with the resident. I also have seen the patient and performed key portions of the examination. I agree with the documented assessment and plan as documented by the resident.   GC modifier added to this encounter      Van Romnao MD 3/22/2023 2:10 PM
injury (right shoulder) present. Comments: Right arm is in a sling. Decreased range of motion of right arm and shoulder. Decrease strength in right arm. Skin:     General: Skin is warm and dry. Neurological:      Mental Status: He is alert and oriented to person, place, and time. Mental status is at baseline. Sensory: No sensory deficit. An electronic signature was used to authenticate this note.     --Anais Benitez DO

## 2023-04-01 LAB
EKG ATRIAL RATE: 98 BPM
EKG P AXIS: 80 DEGREES
EKG P-R INTERVAL: 174 MS
EKG Q-T INTERVAL: 350 MS
EKG QRS DURATION: 84 MS
EKG QTC CALCULATION (BAZETT): 446 MS
EKG R AXIS: 23 DEGREES
EKG T AXIS: 69 DEGREES
EKG VENTRICULAR RATE: 98 BPM

## 2023-05-28 ENCOUNTER — HOSPITAL ENCOUNTER (INPATIENT)
Age: 73
LOS: 1 days | Discharge: HOME | DRG: 101 | End: 2023-05-29
Attending: EMERGENCY MEDICINE | Admitting: INTERNAL MEDICINE
Payer: MEDICARE

## 2023-05-28 ENCOUNTER — APPOINTMENT (OUTPATIENT)
Dept: CT IMAGING | Age: 73
DRG: 101 | End: 2023-05-28
Payer: MEDICARE

## 2023-05-28 DIAGNOSIS — I60.9 NONTRAUMATIC SUBARACHNOID HEMORRHAGE (HCC): ICD-10-CM

## 2023-05-28 DIAGNOSIS — R56.9 SEIZURES (HCC): Primary | ICD-10-CM

## 2023-05-28 DIAGNOSIS — I62.9 INTRACRANIAL BLEED (HCC): ICD-10-CM

## 2023-05-28 LAB
ALBUMIN SERPL BCG-MCNC: 4.1 G/DL (ref 3.5–5.1)
ALP SERPL-CCNC: 98 U/L (ref 38–126)
ALT SERPL W/O P-5'-P-CCNC: 14 U/L (ref 11–66)
ANION GAP SERPL CALC-SCNC: 14 MEQ/L (ref 8–16)
AST SERPL-CCNC: 15 U/L (ref 5–40)
BILIRUB SERPL-MCNC: 0.2 MG/DL (ref 0.3–1.2)
BUN SERPL-MCNC: 18 MG/DL (ref 7–22)
CALCIUM SERPL-MCNC: 9.5 MG/DL (ref 8.5–10.5)
CHLORIDE SERPL-SCNC: 103 MEQ/L (ref 98–111)
CO2 SERPL-SCNC: 23 MEQ/L (ref 23–33)
CREAT SERPL-MCNC: 0.9 MG/DL (ref 0.4–1.2)
DEPRECATED RDW RBC AUTO: 47 FL (ref 35–45)
ERYTHROCYTE [DISTWIDTH] IN BLOOD BY AUTOMATED COUNT: 13.9 % (ref 11.5–14.5)
ETHANOL SERPL-MCNC: < 0.01 %
GFR SERPL CREATININE-BSD FRML MDRD: > 60 ML/MIN/1.73M2
GLUCOSE SERPL-MCNC: 150 MG/DL (ref 70–108)
HCT VFR BLD AUTO: 43.8 % (ref 42–52)
HGB BLD-MCNC: 14.2 GM/DL (ref 14–18)
MAGNESIUM SERPL-MCNC: 1.9 MG/DL (ref 1.6–2.4)
MCH RBC QN AUTO: 30.3 PG (ref 26–33)
MCHC RBC AUTO-ENTMCNC: 32.4 GM/DL (ref 32.2–35.5)
MCV RBC AUTO: 93.4 FL (ref 80–94)
OSMOLALITY SERPL CALC.SUM OF ELEC: 284.2 MOSMOL/KG (ref 275–300)
PLATELET # BLD AUTO: 232 THOU/MM3 (ref 130–400)
PMV BLD AUTO: 8.9 FL (ref 9.4–12.4)
POTASSIUM SERPL-SCNC: 4.3 MEQ/L (ref 3.5–5.2)
PROT SERPL-MCNC: 6.6 G/DL (ref 6.1–8)
RBC # BLD AUTO: 4.69 MILL/MM3 (ref 4.7–6.1)
SODIUM SERPL-SCNC: 140 MEQ/L (ref 135–145)
TROPONIN T: < 0.01 NG/ML
WBC # BLD AUTO: 5.6 THOU/MM3 (ref 4.8–10.8)

## 2023-05-28 PROCEDURE — 36415 COLL VENOUS BLD VENIPUNCTURE: CPT

## 2023-05-28 PROCEDURE — 70450 CT HEAD/BRAIN W/O DYE: CPT

## 2023-05-28 PROCEDURE — 82077 ASSAY SPEC XCP UR&BREATH IA: CPT

## 2023-05-28 PROCEDURE — 84484 ASSAY OF TROPONIN QUANT: CPT

## 2023-05-28 PROCEDURE — 85027 COMPLETE CBC AUTOMATED: CPT

## 2023-05-28 PROCEDURE — 93005 ELECTROCARDIOGRAM TRACING: CPT | Performed by: STUDENT IN AN ORGANIZED HEALTH CARE EDUCATION/TRAINING PROGRAM

## 2023-05-28 PROCEDURE — 80053 COMPREHEN METABOLIC PANEL: CPT

## 2023-05-28 PROCEDURE — 96365 THER/PROPH/DIAG IV INF INIT: CPT

## 2023-05-28 PROCEDURE — 96374 THER/PROPH/DIAG INJ IV PUSH: CPT

## 2023-05-28 PROCEDURE — 71275 CT ANGIOGRAPHY CHEST: CPT

## 2023-05-28 PROCEDURE — 99285 EMERGENCY DEPT VISIT HI MDM: CPT

## 2023-05-28 PROCEDURE — 83735 ASSAY OF MAGNESIUM: CPT

## 2023-05-28 ASSESSMENT — PAIN - FUNCTIONAL ASSESSMENT: PAIN_FUNCTIONAL_ASSESSMENT: NONE - DENIES PAIN

## 2023-05-29 ENCOUNTER — APPOINTMENT (OUTPATIENT)
Dept: CT IMAGING | Age: 73
DRG: 101 | End: 2023-05-29
Payer: MEDICARE

## 2023-05-29 ENCOUNTER — APPOINTMENT (OUTPATIENT)
Dept: MRI IMAGING | Age: 73
DRG: 101 | End: 2023-05-29
Payer: MEDICARE

## 2023-05-29 VITALS
SYSTOLIC BLOOD PRESSURE: 96 MMHG | DIASTOLIC BLOOD PRESSURE: 38 MMHG | TEMPERATURE: 97.9 F | BODY MASS INDEX: 25.03 KG/M2 | HEART RATE: 71 BPM | OXYGEN SATURATION: 98 % | RESPIRATION RATE: 20 BRPM | HEIGHT: 74 IN | WEIGHT: 195 LBS

## 2023-05-29 PROBLEM — I60.9 SUBARACHNOID BLEED (HCC): Status: RESOLVED | Noted: 2023-05-29 | Resolved: 2023-05-29

## 2023-05-29 PROBLEM — I60.9 SUBARACHNOID BLEED (HCC): Status: ACTIVE | Noted: 2023-05-29

## 2023-05-29 PROBLEM — R56.9 SEIZURES (HCC): Status: ACTIVE | Noted: 2023-05-29

## 2023-05-29 PROBLEM — R40.20 LOSS OF CONSCIOUSNESS (HCC): Status: ACTIVE | Noted: 2023-05-29

## 2023-05-29 LAB
AMPHETAMINES UR QL SCN: NEGATIVE
APTT PPP: 30 SECONDS (ref 22–38)
BARBITURATES UR QL SCN: NEGATIVE
BENZODIAZ UR QL SCN: NEGATIVE
BILIRUB UR QL STRIP.AUTO: NEGATIVE
BZE UR QL SCN: NEGATIVE
CANNABINOIDS UR QL SCN: NEGATIVE
CHARACTER UR: CLEAR
COLOR: YELLOW
FENTANYL: NEGATIVE
GLUCOSE BLD STRIP.AUTO-MCNC: 153 MG/DL (ref 70–108)
GLUCOSE UR QL STRIP.AUTO: NEGATIVE MG/DL
HGB UR QL STRIP.AUTO: NEGATIVE
INR PPP: 0.91 (ref 0.85–1.13)
KETONES UR QL STRIP.AUTO: NEGATIVE
MRSA DNA SPEC QL NAA+PROBE: NEGATIVE
NITRITE UR QL STRIP: NEGATIVE
OPIATES UR QL SCN: NEGATIVE
OXYCODONE: NEGATIVE
PCP UR QL SCN: NEGATIVE
PH UR STRIP.AUTO: 7 [PH] (ref 5–9)
PROT UR STRIP.AUTO-MCNC: NEGATIVE MG/DL
SP GR UR REFRACT.AUTO: 1.01 (ref 1–1.03)
TROPONIN T: 0.01 NG/ML
UROBILINOGEN, URINE: 0.2 EU/DL (ref 0–1)
WBC #/AREA URNS HPF: NEGATIVE /[HPF]

## 2023-05-29 PROCEDURE — 81003 URINALYSIS AUTO W/O SCOPE: CPT

## 2023-05-29 PROCEDURE — 6360000004 HC RX CONTRAST MEDICATION: Performed by: STUDENT IN AN ORGANIZED HEALTH CARE EDUCATION/TRAINING PROGRAM

## 2023-05-29 PROCEDURE — 85610 PROTHROMBIN TIME: CPT

## 2023-05-29 PROCEDURE — 70496 CT ANGIOGRAPHY HEAD: CPT

## 2023-05-29 PROCEDURE — 36415 COLL VENOUS BLD VENIPUNCTURE: CPT

## 2023-05-29 PROCEDURE — 93010 ELECTROCARDIOGRAM REPORT: CPT | Performed by: INTERNAL MEDICINE

## 2023-05-29 PROCEDURE — 70553 MRI BRAIN STEM W/O & W/DYE: CPT

## 2023-05-29 PROCEDURE — A9579 GAD-BASE MR CONTRAST NOS,1ML: HCPCS | Performed by: NURSE PRACTITIONER

## 2023-05-29 PROCEDURE — 99223 1ST HOSP IP/OBS HIGH 75: CPT | Performed by: SOCIAL WORKER

## 2023-05-29 PROCEDURE — 82948 REAGENT STRIP/BLOOD GLUCOSE: CPT

## 2023-05-29 PROCEDURE — 85730 THROMBOPLASTIN TIME PARTIAL: CPT

## 2023-05-29 PROCEDURE — 84484 ASSAY OF TROPONIN QUANT: CPT

## 2023-05-29 PROCEDURE — 99222 1ST HOSP IP/OBS MODERATE 55: CPT | Performed by: NEUROLOGICAL SURGERY

## 2023-05-29 PROCEDURE — 6360000002 HC RX W HCPCS

## 2023-05-29 PROCEDURE — 87641 MR-STAPH DNA AMP PROBE: CPT

## 2023-05-29 PROCEDURE — 70498 CT ANGIOGRAPHY NECK: CPT

## 2023-05-29 PROCEDURE — 2000000000 HC ICU R&B

## 2023-05-29 PROCEDURE — 6360000004 HC RX CONTRAST MEDICATION: Performed by: NURSE PRACTITIONER

## 2023-05-29 PROCEDURE — 99291 CRITICAL CARE FIRST HOUR: CPT | Performed by: INTERNAL MEDICINE

## 2023-05-29 PROCEDURE — 80307 DRUG TEST PRSMV CHEM ANLYZR: CPT

## 2023-05-29 PROCEDURE — 6360000004 HC RX CONTRAST MEDICATION: Performed by: EMERGENCY MEDICINE

## 2023-05-29 PROCEDURE — 2580000003 HC RX 258: Performed by: STUDENT IN AN ORGANIZED HEALTH CARE EDUCATION/TRAINING PROGRAM

## 2023-05-29 PROCEDURE — 87070 CULTURE OTHR SPECIMN AEROBIC: CPT

## 2023-05-29 PROCEDURE — 6360000002 HC RX W HCPCS: Performed by: STUDENT IN AN ORGANIZED HEALTH CARE EDUCATION/TRAINING PROGRAM

## 2023-05-29 RX ORDER — MIDAZOLAM HYDROCHLORIDE 1 MG/ML
INJECTION INTRAMUSCULAR; INTRAVENOUS
Status: COMPLETED
Start: 2023-05-29 | End: 2023-05-29

## 2023-05-29 RX ORDER — MIDAZOLAM HYDROCHLORIDE 1 MG/ML
2 INJECTION INTRAMUSCULAR; INTRAVENOUS ONCE
Status: COMPLETED | OUTPATIENT
Start: 2023-05-29 | End: 2023-05-29

## 2023-05-29 RX ORDER — ONDANSETRON 2 MG/ML
4 INJECTION INTRAMUSCULAR; INTRAVENOUS EVERY 6 HOURS PRN
Status: DISCONTINUED | OUTPATIENT
Start: 2023-05-29 | End: 2023-05-29 | Stop reason: HOSPADM

## 2023-05-29 RX ORDER — ACETAMINOPHEN 650 MG/1
650 SUPPOSITORY RECTAL EVERY 6 HOURS PRN
Status: DISCONTINUED | OUTPATIENT
Start: 2023-05-29 | End: 2023-05-29 | Stop reason: HOSPADM

## 2023-05-29 RX ORDER — LEVETIRACETAM 750 MG/1
750 TABLET ORAL 2 TIMES DAILY
Qty: 60 TABLET | Refills: 1 | Status: SHIPPED | OUTPATIENT
Start: 2023-05-29 | End: 2023-07-28

## 2023-05-29 RX ORDER — LORAZEPAM 2 MG/ML
INJECTION INTRAMUSCULAR
Status: COMPLETED
Start: 2023-05-29 | End: 2023-05-29

## 2023-05-29 RX ORDER — LORAZEPAM 2 MG/ML
1 INJECTION INTRAMUSCULAR ONCE
Status: COMPLETED | OUTPATIENT
Start: 2023-05-29 | End: 2023-05-29

## 2023-05-29 RX ORDER — ACETAMINOPHEN 325 MG/1
650 TABLET ORAL EVERY 6 HOURS PRN
Status: DISCONTINUED | OUTPATIENT
Start: 2023-05-29 | End: 2023-05-29 | Stop reason: HOSPADM

## 2023-05-29 RX ORDER — MIDAZOLAM HYDROCHLORIDE 1 MG/ML
INJECTION INTRAMUSCULAR; INTRAVENOUS
Status: DISPENSED
Start: 2023-05-29 | End: 2023-05-29

## 2023-05-29 RX ORDER — ONDANSETRON 4 MG/1
4 TABLET, ORALLY DISINTEGRATING ORAL EVERY 8 HOURS PRN
Status: DISCONTINUED | OUTPATIENT
Start: 2023-05-29 | End: 2023-05-29 | Stop reason: HOSPADM

## 2023-05-29 RX ORDER — POLYETHYLENE GLYCOL 3350 17 G/17G
17 POWDER, FOR SOLUTION ORAL DAILY PRN
Status: DISCONTINUED | OUTPATIENT
Start: 2023-05-29 | End: 2023-05-29 | Stop reason: HOSPADM

## 2023-05-29 RX ORDER — SODIUM CHLORIDE 9 MG/ML
INJECTION, SOLUTION INTRAVENOUS CONTINUOUS
Status: DISCONTINUED | OUTPATIENT
Start: 2023-05-29 | End: 2023-05-29

## 2023-05-29 RX ADMIN — LORAZEPAM 1 MG: 2 INJECTION INTRAMUSCULAR at 02:08

## 2023-05-29 RX ADMIN — MIDAZOLAM HYDROCHLORIDE 2 MG: 1 INJECTION INTRAMUSCULAR; INTRAVENOUS at 02:19

## 2023-05-29 RX ADMIN — IOPAMIDOL 80 ML: 755 INJECTION, SOLUTION INTRAVENOUS at 00:38

## 2023-05-29 RX ADMIN — GADOTERIDOL 20 ML: 279.3 INJECTION, SOLUTION INTRAVENOUS at 11:58

## 2023-05-29 RX ADMIN — LORAZEPAM 1 MG: 2 INJECTION INTRAMUSCULAR; INTRAVENOUS at 02:15

## 2023-05-29 RX ADMIN — LORAZEPAM 1 MG: 2 INJECTION INTRAMUSCULAR; INTRAVENOUS at 02:08

## 2023-05-29 RX ADMIN — MIDAZOLAM HYDROCHLORIDE 2 MG: 1 INJECTION, SOLUTION INTRAMUSCULAR; INTRAVENOUS at 02:19

## 2023-05-29 RX ADMIN — LEVETIRACETAM 3000 MG: 100 INJECTION, SOLUTION INTRAVENOUS at 02:49

## 2023-05-29 RX ADMIN — MIDAZOLAM HYDROCHLORIDE 2 MG: 1 INJECTION INTRAMUSCULAR; INTRAVENOUS at 02:22

## 2023-05-29 RX ADMIN — MIDAZOLAM HYDROCHLORIDE 2 MG: 1 INJECTION, SOLUTION INTRAMUSCULAR; INTRAVENOUS at 02:22

## 2023-05-29 RX ADMIN — IOPAMIDOL 80 ML: 755 INJECTION, SOLUTION INTRAVENOUS at 03:18

## 2023-05-29 ASSESSMENT — ENCOUNTER SYMPTOMS
PHOTOPHOBIA: 0
NAUSEA: 0
VOMITING: 0
DIARRHEA: 0
COUGH: 0
SHORTNESS OF BREATH: 0
SINUS PRESSURE: 0
SINUS PAIN: 0
ABDOMINAL DISTENTION: 0
COLOR CHANGE: 0
SORE THROAT: 0
TROUBLE SWALLOWING: 0
RHINORRHEA: 0
BACK PAIN: 0
EYE REDNESS: 0

## 2023-05-29 ASSESSMENT — PAIN - FUNCTIONAL ASSESSMENT
PAIN_FUNCTIONAL_ASSESSMENT: NONE - DENIES PAIN

## 2023-05-30 ENCOUNTER — TELEPHONE (OUTPATIENT)
Dept: FAMILY MEDICINE CLINIC | Age: 73
End: 2023-05-30

## 2023-05-30 LAB
EKG ATRIAL RATE: 103 BPM
EKG P AXIS: 79 DEGREES
EKG P-R INTERVAL: 180 MS
EKG Q-T INTERVAL: 338 MS
EKG QRS DURATION: 88 MS
EKG QTC CALCULATION (BAZETT): 442 MS
EKG R AXIS: 27 DEGREES
EKG T AXIS: 78 DEGREES
EKG VENTRICULAR RATE: 103 BPM

## 2023-05-30 ASSESSMENT — ENCOUNTER SYMPTOMS
VOMITING: 0
EYE REDNESS: 0
SHORTNESS OF BREATH: 0
COLOR CHANGE: 0
ABDOMINAL DISTENTION: 0
RHINORRHEA: 0
SINUS PRESSURE: 0
SINUS PAIN: 0
BACK PAIN: 0
SORE THROAT: 0
NAUSEA: 0
TROUBLE SWALLOWING: 0
COUGH: 0

## 2023-05-31 LAB — BACTERIA SPEC AEROBE CULT: NORMAL

## 2023-06-06 NOTE — TELEPHONE ENCOUNTER
Care Transitions Initial Follow Up Call    Outreach made within 2 business days of discharge: No    Patient: Irwin Aldrich Patient : 1950   MRN: [id number]  Reason for Admission: There are no discharge diagnoses documented for the most recent discharge. Discharge Date: 23       Spoke with: PAUL for patient to return call at their earliest convenience.    Contact letter sent   Discharge department/facility: [facility]        Scheduled appointment with PCP within 7-14 days    Follow Up  Future Appointments   Date Time Provider Lana Kamara   2023  2:00 PM Valerie Quintana MD [facility] - [address] [place] [address] ([address] [place] [address])

## 2023-06-07 ENCOUNTER — OFFICE VISIT (OUTPATIENT)
Dept: FAMILY MEDICINE CLINIC | Age: 73
End: 2023-06-07

## 2023-06-07 VITALS
HEART RATE: 90 BPM | HEIGHT: 74 IN | OXYGEN SATURATION: 97 % | WEIGHT: 195 LBS | TEMPERATURE: 97.5 F | DIASTOLIC BLOOD PRESSURE: 86 MMHG | SYSTOLIC BLOOD PRESSURE: 152 MMHG | RESPIRATION RATE: 22 BRPM | BODY MASS INDEX: 25.03 KG/M2

## 2023-06-07 DIAGNOSIS — Z09 HOSPITAL DISCHARGE FOLLOW-UP: ICD-10-CM

## 2023-06-07 DIAGNOSIS — R56.9 SEIZURES (HCC): Primary | ICD-10-CM

## 2023-06-07 DIAGNOSIS — R03.0 ELEVATED BLOOD PRESSURE READING: ICD-10-CM

## 2023-06-07 NOTE — PROGRESS NOTES
Post-Discharge Transitional Care  Follow Up      Mauro Benavidez   YOB: 1950    Date of Office Visit:  6/7/2023  Date of Hospital Admission: 5/28/23  Date of Hospital Discharge: 5/29/23  Risk of hospital readmission (high >=14%. Medium >=10%) :Readmission Risk Score: 12.3      Care management risk score Rising risk (score 2-5) and Complex Care (Scores >=6): No Risk Score On File     Non face to face  following discharge, date last encounter closed (first attempt may have been earlier): 05/30/2023    Call initiated 2 business days of discharge: No    ASSESSMENT/PLAN:   Seizures ([address]  -     EEG; Future  Hospital discharge follow-up  -     GA DISCHARGE MEDS RECONCILED W/ CURRENT OUTPATIENT MED LIST  Elevated blood pressure reading      New onset seizure in 70 y/o male,  MRI grossly nonfocal for neuro  Neuro exam benign. Continue on Keppra 750 BID  Some mild headache and fatigue could be 2/2 seizure or Keppra  Ordered EEG, follow up with Neurology pending scheduling  Can do tylenol or ibuprofen for headache if not improved in few days call office  Elevated bp reading, in isolation, Pt has bp cuff at home, take bp cuff readings x 2 wks  Advised no driving    Medical Decision Making: moderate complexity  Return in about 2 months (around 8/7/2023) for f/u Seizures, BP. On this date 6/7/2023 I have spent 30 minutes reviewing previous notes, test results and face to face with the patient discussing the diagnosis and importance of compliance with the treatment plan as well as documenting on the day of the visit. Subjective:   HPI:  Follow up of Hospital problems/diagnosis(es):     Seizure [facility])      Inpatient course: Discharge summary reviewed- see chart. Interval history/Current status:     Presents with daughter who was not present to witness first seizure episode. Notes grand mal status epilepticus in ED during admit.  Since then Seizure free, pending follow up with neurology to be

## 2023-06-07 NOTE — PROGRESS NOTES
Attending Physician Note    I saw and evaluated the patient, performing the key elements of the service. I discussed the findings, assessment and plan with the resident and agree with the resident's findings and plan as documented in the resident's note. GC modifier added. Brief summary:  New onset seizures - no obvious etiology found. Did have evidence of sphenoid sinusitis on imaging - no sx. Has EEG ordered and will follow up with neurologist and ENT. On Keppra. No further seizure activity. Aware not to drive.

## 2023-08-08 ENCOUNTER — OFFICE VISIT (OUTPATIENT)
Dept: FAMILY MEDICINE CLINIC | Age: 73
End: 2023-08-08
Payer: MEDICARE

## 2023-08-08 VITALS
HEIGHT: 74 IN | WEIGHT: 195 LBS | BODY MASS INDEX: 25.03 KG/M2 | DIASTOLIC BLOOD PRESSURE: 86 MMHG | RESPIRATION RATE: 18 BRPM | TEMPERATURE: 98.2 F | HEART RATE: 95 BPM | OXYGEN SATURATION: 96 % | SYSTOLIC BLOOD PRESSURE: 142 MMHG

## 2023-08-08 DIAGNOSIS — Z13.6 ENCOUNTER FOR LIPID SCREENING FOR CARDIOVASCULAR DISEASE: ICD-10-CM

## 2023-08-08 DIAGNOSIS — Z72.0 TOBACCO USE: ICD-10-CM

## 2023-08-08 DIAGNOSIS — R73.9 HYPERGLYCEMIA: Primary | ICD-10-CM

## 2023-08-08 DIAGNOSIS — I10 PRIMARY HYPERTENSION: ICD-10-CM

## 2023-08-08 DIAGNOSIS — Z13.220 ENCOUNTER FOR LIPID SCREENING FOR CARDIOVASCULAR DISEASE: ICD-10-CM

## 2023-08-08 DIAGNOSIS — R63.0 DECREASED APPETITE: ICD-10-CM

## 2023-08-08 PROCEDURE — G8427 DOCREV CUR MEDS BY ELIG CLIN: HCPCS

## 2023-08-08 PROCEDURE — 4004F PT TOBACCO SCREEN RCVD TLK: CPT

## 2023-08-08 PROCEDURE — 99213 OFFICE O/P EST LOW 20 MIN: CPT

## 2023-08-08 PROCEDURE — 3077F SYST BP >= 140 MM HG: CPT

## 2023-08-08 PROCEDURE — 3017F COLORECTAL CA SCREEN DOC REV: CPT

## 2023-08-08 PROCEDURE — 1124F ACP DISCUSS-NO DSCNMKR DOCD: CPT

## 2023-08-08 PROCEDURE — G8417 CALC BMI ABV UP PARAM F/U: HCPCS

## 2023-08-08 PROCEDURE — 3079F DIAST BP 80-89 MM HG: CPT

## 2023-08-08 RX ORDER — LISINOPRIL 10 MG/1
10 TABLET ORAL DAILY
Qty: 90 TABLET | Refills: 1 | Status: SHIPPED | OUTPATIENT
Start: 2023-08-08

## 2023-08-08 ASSESSMENT — ENCOUNTER SYMPTOMS
CONSTIPATION: 0
CHEST TIGHTNESS: 0
ABDOMINAL PAIN: 0
SHORTNESS OF BREATH: 0
NAUSEA: 0
VOMITING: 0
DIARRHEA: 0

## 2023-08-08 NOTE — PROGRESS NOTES
iSlvio Perales (:  1950) is a 71 y.o. male,Established patient, here for evaluation of the following chief complaint(s):  Follow-up (Pt is on keppra for seizures, medication is causing him to sleep more and have no appetite, he is wanting to see if he can change this medication due to these side effects. )         ASSESSMENT/PLAN:  1. Hyperglycemia  -     Hemoglobin A1C; Future  Patient was noted to have previously elevated glucose reading. We will check hemoglobin A1c at this time to rule out any underlying diabetes. We will follow-up with results. 2. Decreased appetite  -     TSH; Future  -     Lipid Panel; Future  Given the decreased appetite and patient reporting weight loss, will check TSH control any underlying thyroid disorder and will also check lipid panel. Recommend patient to get Ensure shakes over-the-counter and to drink 1/day for nutritional supplement. Follow-up in 4 weeks. 3. Tobacco use  -     CT LUNG SCREEN [Initial/Annual]; Future  Patient has history of tobacco use smoking since he was 22years old. At this time we will get CT lung screening performed. We will follow-up with results. 4. Encounter for lipid screening for cardiovascular disease  -     Lipid Panel; Future  We will check lipid panel at this time to rule out any underlying lipid disorders. We will follow-up with test results. 5. Primary hypertension  -     lisinopril (PRINIVIL;ZESTRIL) 10 MG tablet; Take 1 tablet by mouth daily, Disp-90 tablet, R-1Normal  Blood pressure is noted to be elevated during last visit and this visit and upon repeat check. We will start lisinopril 10 mg daily at this time. Patient is recommended to check blood pressures at home and to monitor them with goal blood pressures to be around 120/80. We will follow-up in 4 weeks. Return in about 4 weeks (around 2023). Subjective   SUBJECTIVE/OBJECTIVE:  Patient is a 61-year-old male who presents for follow-up.   Patient states

## 2023-08-09 ENCOUNTER — HOSPITAL ENCOUNTER (OUTPATIENT)
Age: 73
Discharge: HOME | End: 2023-08-09
Payer: MEDICARE

## 2023-08-09 ENCOUNTER — OFFICE VISIT (OUTPATIENT)
Dept: NEUROLOGY | Age: 73
End: 2023-08-09
Payer: MEDICARE

## 2023-08-09 VITALS
HEIGHT: 73 IN | OXYGEN SATURATION: 97 % | HEART RATE: 94 BPM | SYSTOLIC BLOOD PRESSURE: 140 MMHG | WEIGHT: 195 LBS | BODY MASS INDEX: 25.84 KG/M2 | DIASTOLIC BLOOD PRESSURE: 80 MMHG

## 2023-08-09 DIAGNOSIS — Z13.220 ENCOUNTER FOR LIPID SCREENING FOR CARDIOVASCULAR DISEASE: ICD-10-CM

## 2023-08-09 DIAGNOSIS — Z13.6 ENCOUNTER FOR LIPID SCREENING FOR CARDIOVASCULAR DISEASE: ICD-10-CM

## 2023-08-09 DIAGNOSIS — R56.9 SEIZURE (HCC): ICD-10-CM

## 2023-08-09 DIAGNOSIS — R56.9 ALCOHOL RELATED SEIZURE (HCC): ICD-10-CM

## 2023-08-09 DIAGNOSIS — R73.9 HYPERGLYCEMIA: ICD-10-CM

## 2023-08-09 DIAGNOSIS — R63.0 DECREASED APPETITE: ICD-10-CM

## 2023-08-09 DIAGNOSIS — R56.9 SEIZURE (HCC): Primary | ICD-10-CM

## 2023-08-09 LAB
CHOLEST SERPL-MCNC: 203 MG/DL (ref 100–199)
DEPRECATED MEAN GLUCOSE BLD GHB EST-ACNC: 117 MG/DL (ref 70–126)
HBA1C MFR BLD HPLC: 5.9 % (ref 4.4–6.4)
HDLC SERPL-MCNC: 46 MG/DL
LDLC SERPL CALC-MCNC: 138 MG/DL
TRIGL SERPL-MCNC: 95 MG/DL (ref 0–199)
TSH SERPL DL<=0.005 MIU/L-ACNC: 1.77 UIU/ML (ref 0.4–4.2)

## 2023-08-09 PROCEDURE — G8417 CALC BMI ABV UP PARAM F/U: HCPCS | Performed by: PSYCHIATRY & NEUROLOGY

## 2023-08-09 PROCEDURE — 3017F COLORECTAL CA SCREEN DOC REV: CPT | Performed by: PSYCHIATRY & NEUROLOGY

## 2023-08-09 PROCEDURE — 99205 OFFICE O/P NEW HI 60 MIN: CPT | Performed by: PSYCHIATRY & NEUROLOGY

## 2023-08-09 PROCEDURE — 36415 COLL VENOUS BLD VENIPUNCTURE: CPT

## 2023-08-09 PROCEDURE — 80061 LIPID PANEL: CPT

## 2023-08-09 PROCEDURE — 84443 ASSAY THYROID STIM HORMONE: CPT

## 2023-08-09 PROCEDURE — 80177 DRUG SCRN QUAN LEVETIRACETAM: CPT

## 2023-08-09 PROCEDURE — 1124F ACP DISCUSS-NO DSCNMKR DOCD: CPT | Performed by: PSYCHIATRY & NEUROLOGY

## 2023-08-09 PROCEDURE — 4004F PT TOBACCO SCREEN RCVD TLK: CPT | Performed by: PSYCHIATRY & NEUROLOGY

## 2023-08-09 PROCEDURE — 83036 HEMOGLOBIN GLYCOSYLATED A1C: CPT

## 2023-08-09 PROCEDURE — G8427 DOCREV CUR MEDS BY ELIG CLIN: HCPCS | Performed by: PSYCHIATRY & NEUROLOGY

## 2023-08-09 NOTE — PATIENT INSTRUCTIONS
EEG  Continue with Keppra at current dose  Keppra level  You need to avoid alcohol as discussed  No driving, swimming, operating heavy machinery or compromising heights until cleared. Report any new events. Call if any questions. Call with any new symptoms or concerns. Follow up in 6 weeks.

## 2023-08-10 ENCOUNTER — TELEPHONE (OUTPATIENT)
Dept: FAMILY MEDICINE CLINIC | Age: 73
End: 2023-08-10

## 2023-08-10 NOTE — TELEPHONE ENCOUNTER
----- Message from [facility] DO sent at 8/9/2023  1:03 PM EDT -----  Cholesterol is slightly elevated, recommend decreasing high fat foods such as fried, greasy foods. Hemoglobin A1c is in the pre-diabetic range at 5.9%. Recommend that he watch what he eats and decrease any added sugars.

## 2023-08-12 LAB — LEVETIRACETAM SERPL-MCNC: 7 UG/ML (ref 10–40)

## 2023-08-14 ENCOUNTER — HOSPITAL ENCOUNTER (OUTPATIENT)
Dept: NEUROLOGY | Age: 73
Discharge: HOME | End: 2023-08-14
Payer: MEDICARE

## 2023-08-14 DIAGNOSIS — R56.9 ALCOHOL RELATED SEIZURE (HCC): ICD-10-CM

## 2023-08-14 DIAGNOSIS — R56.9 SEIZURE (HCC): ICD-10-CM

## 2023-08-14 DIAGNOSIS — R56.9 SEIZURE (HCC): Primary | ICD-10-CM

## 2023-08-14 PROCEDURE — 95819 EEG AWAKE AND ASLEEP: CPT | Performed by: PSYCHIATRY & NEUROLOGY

## 2023-08-14 PROCEDURE — 95816 EEG AWAKE AND DROWSY: CPT

## 2023-08-14 NOTE — TELEPHONE ENCOUNTER
----- Message from LUIS Carreon CNP sent at 8/13/2023  3:16 PM EDT -----  Please let patient know his Keppra level is low=7. Is he taking the medication consistently? Has he missed any doses?   Mamie Sam CNP

## 2023-08-14 NOTE — PROGRESS NOTES
[facility] Laboratory Technician worksheet       EEG Date: 2023    Name: Kamlesh Patten   : 1950   Age: 71 y.o. SEX: male    Room: OP    MRN: [id number]     CSN: [id number]    Ordering Provider: Shemar Kingston CNP  EEG Number: [id number] Time of Test:  6694    Hand: Right   Sedation: No    H.V. Done: No  -  Photic: Yes    Sleep: Yes   Drowsy: Yes   Sleep Deprived: No    Seizures observed: no    Mentality: alert       Clinical History: history of seizures    Past Medical History: No past medical history on file. Prior to Admission medications    Medication Sig Start Date End Date Taking?  Authorizing Provider   lisinopril (PRINIVIL;ZESTRIL) 10 MG tablet Take 1 tablet by mouth daily 23   Geneva Smith DO   levETIRAcetam (KEPPRA) 750 MG tablet Take 1 tablet by mouth 2 times daily 23  LUSI Lima - CNP       Technician: Nati Casey RCP 2023

## 2023-08-15 NOTE — TELEPHONE ENCOUNTER
Spoke with Mary Escalante who stated patient might not be taking the [address] [place] [address] [address] as ordered. Mary Escalante stated patient seems to be a different person since starting the [address] [place] [address]. Mary Escalante stated patient does not like how the [address] [place] [address] [address] makes him feel. Mary Escalante will verify with patient if he is taking the [address] [place] [address] [address] consistently and call office back.

## 2023-08-15 NOTE — PROCEDURES
[place] [address]                          ELECTROENCEPHALOGRAM REPORT    PATIENT NAME: Mehnaz Wiseman                        :        1950  MED REC NO:   [id number]                           ROOM:  ACCOUNT NO:   [de-identified]                           ADMIT DATE: 2023  PROVIDER:     Balwinder Alexander. Stevie Olszewski, MD    DATE OF EE2023    REFERRING PROVIDER:  LUIS Subramanian CNP    CLINICAL HISTORY:  A 67-year-old male presenting with seizure history. Evaluate for subclinical seizure. Medications listed are Keppra,  lisinopril. This is a routine 20-minute EEG recording using the international 10/20  system on [employer] workstation. Automated spike, and seizure detection  algorithms were applied. The patient is described as alert. The background rhythm activity is noted to be 7-8 Hz in the posterior  parietal area, symmetric, attenuates with eye opening. Frequent blink  artifact is noted limiting quality of data obtained initially in this  study. There was no evidence of epileptiform activity appreciated. Photic stimulation was performed without abnormality. The patient is  noted to be asleep during parts of recording. There was no evidence of  epileptiform activity appreciated. No clinical seizures were observed. IMPRESSION:  This is a normal EEG. There was no evidence of  epileptiform activity appreciated.         Maikel Che MD    D: 08/15/2023 8:30:30       T: 08/15/2023 8:34:06     [id number]  Job#: [id number]     Doc#: [id number]    CC:

## 2023-08-16 RX ORDER — LEVETIRACETAM 750 MG/1
750 TABLET ORAL 2 TIMES DAILY
Qty: 60 TABLET | Refills: 1 | Status: SHIPPED | OUTPATIENT
Start: 2023-08-16 | End: 2023-10-15

## 2023-08-16 RX ORDER — LAMOTRIGINE 25 MG/1
TABLET ORAL
Qty: 120 TABLET | Refills: 1 | Status: SHIPPED | OUTPATIENT
Start: 2023-08-16

## 2023-08-16 NOTE — PROGRESS NOTES
Chief Complaint   Patient presents with    Consultation     Seizure      Renay Talbot is a 71 y.o. male who presents today for evaluation of new onset seizure in May 2023. Patient's family reports that the first seizure was tonic with unresponsiveness. The second seizure was tonic-clonic in nature, unclear if it was partial or generalized. Patient did have a third seizure which was witnessed by staff in the emergency department, described as generalized tonic-clonic in which was aborted with Versed. No loss of bowel/bladder control, tongue bites with any of the seizures. He reports he drank 2-3 beers that day. CT head WO contrast done 5/29/23 Equivocal for trace subarachnoid or petechial hemorrhage in the left  posterior frontal lobe. Recommend short interval follow-up. MRI brain W/WO contrast done 5/29/23 showed No acute findings in the brain. Mild severity chronic small vessel ischemic changes. 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 Motion degraded examination. No EEG done. He was started on Keppra, however complains of side effects of loss of appetite, fatigue. He has history of alcohol abuse. He had right shoulder fracture march 2023 after consuming 3/4 bottle of tequila and 20 beers. His sleep is poor and interrupted. He is not drinking on average 8 beers a week. No headache. No illicit drugs. No family history of seizure. He denies chest pain. No shortness of breath, no neck pain. No vision changes. No dysphagia. No fever. No rash. No weight loss. History provided by patient accompanied  by granddaughter. History reviewed. No pertinent past medical history.     Patient Active Problem List   Diagnosis    Closed fracture dislocation of right shoulder, initial encounter    Traumatic closed displaced fracture of right shoulder with anterior dislocation, initial encounter    Loss of consciousness ([address])    Seizures ([address])       No Known Allergies    Current Outpatient Medications   Medication Sig Dispense

## 2023-08-16 NOTE — TELEPHONE ENCOUNTER
Patient agreeable with Lamictal and continuing Keppra while completing titration. Will need a refill of Keppra.

## 2023-08-16 NOTE — TELEPHONE ENCOUNTER
Trice Melendez confirmed patient is taking medication consistently without missing any doses. Trice Melendez asking if patient should continue with Keppra or can consider alternate medication due to patient feels like a different person since starting Keppra and does not like how the [facility] makes him feel. Per Trice Melendez patient stated if medication is not changed he is going to discontinue the Keppra.     [facility]   Please advise  Thank you  [facility]

## 2023-08-16 NOTE — TELEPHONE ENCOUNTER
Given the non tolerance of Keppra. We can switch him to an alternative anti epileptic medication. However during the Transition he needs to continue taking the Keppra at current dosage. Start Lamictal 25 mg daily for 2 weeks then 50 mg daily for 2 weeks then 100 mg daily thereafter. Report any rash. Call if any questions. Please let me know if agreeable to proceed. He does need to follow the instructions on the titration closely, or be helped in doing so accurately.    Amelia Roman MD

## 2023-09-14 ENCOUNTER — OFFICE VISIT (OUTPATIENT)
Dept: FAMILY MEDICINE CLINIC | Age: 73
End: 2023-09-14

## 2023-09-14 VITALS
WEIGHT: 196.8 LBS | HEART RATE: 78 BPM | DIASTOLIC BLOOD PRESSURE: 84 MMHG | RESPIRATION RATE: 12 BRPM | HEIGHT: 73 IN | BODY MASS INDEX: 26.08 KG/M2 | OXYGEN SATURATION: 97 % | TEMPERATURE: 97.5 F | SYSTOLIC BLOOD PRESSURE: 136 MMHG

## 2023-09-14 DIAGNOSIS — Z72.0 TOBACCO USE: ICD-10-CM

## 2023-09-14 DIAGNOSIS — E78.2 MIXED HYPERLIPIDEMIA: Primary | ICD-10-CM

## 2023-09-14 DIAGNOSIS — Z12.11 ENCOUNTER FOR SCREENING COLONOSCOPY: ICD-10-CM

## 2023-09-14 DIAGNOSIS — I10 PRIMARY HYPERTENSION: ICD-10-CM

## 2023-09-14 RX ORDER — ATORVASTATIN CALCIUM 20 MG/1
20 TABLET, FILM COATED ORAL DAILY
Qty: 90 TABLET | Refills: 1 | Status: SHIPPED | OUTPATIENT
Start: 2023-09-14

## 2023-09-14 ASSESSMENT — ENCOUNTER SYMPTOMS
DIARRHEA: 0
CHEST TIGHTNESS: 0
VOMITING: 0
NAUSEA: 0
CONSTIPATION: 0
ABDOMINAL PAIN: 0
SHORTNESS OF BREATH: 0
COUGH: 0

## 2023-09-14 NOTE — PROGRESS NOTES
Grace Summers (:  1950) is a 71 y.o. male,Established patient, here for evaluation of the following chief complaint(s):  1 Month Follow-Up         ASSESSMENT/PLAN:  1. Mixed hyperlipidemia  -     atorvastatin (LIPITOR) 20 MG tablet; Take 1 tablet by mouth daily, Disp-90 tablet, R-1Normal  Lipid panel shows hyperlipidemia with elevated LDL. At this time we will start on atorvastatin 20 mg daily. 2. Primary hypertension   Blood pressure appears to be well controlled on lisinopril 10 mg daily. Continue at this time. 3. Tobacco use   Patient has low-dose CT lung screening ordered, will get it done. 4. Encounter for screening colonoscopy  -     Angie Cardoza MD, Gastroenterology, [facility]  Patient is open to getting screening colonoscopy at this time. We will send referral to GI. The 10-year ASCVD risk score (Justin BAE, et al., 2019) is: 26%    Values used to calculate the score:      Age: 71 years      Sex: Male      Is Non- : No      Diabetic: No      Tobacco smoker: Yes      Systolic Blood Pressure: 651 mmHg      Is BP treated: No      HDL Cholesterol: 46 mg/dL      Total Cholesterol: 203 mg/dL    Return in about 3 months (around 2023). Subjective   SUBJECTIVE/OBJECTIVE:  Patient is a 71-year-old male who presents for follow-up. Patient states he is doing well. Spoke to patient about labs and patient verbalized understanding about his cholesterol being elevated and his LDL being elevated. Notes that he does eat cookies and ice cream nightly and does have more processed or high fat foods. Counseled the patient regarding this and patient is agreeable to decreasing the amount. Patient also states that he will get his CT lung screening done. Patient is open to colonoscopy at this time. Review of Systems   Constitutional:  Negative for activity change, appetite change and diaphoresis.    Respiratory:  Negative for cough, chest tightness and shortness of

## 2023-09-14 NOTE — PATIENT INSTRUCTIONS
Thank you   Thank you for trusting us with your healthcare needs. You may receive a survey regarding today's visit. It would help us out if you would take a few moments to provide your feedback. We value your input. Please bring in ALL medications BOTTLES, including inhalers, herbal supplements, over the counter, prescribed & non-prescribed medicine. The office would like actual medication bottles and a list.   Please note our OFFICE POLICIES:   Prior to getting your labs drawn, please check with your insurance company for benefits and eligibility of lab services. Often, insurance companies cover certain tests for preventative visits only. It is patient's responsibility to see what is covered. We are unable to change a diagnosis after the test has been performed. Please hold onto your original lab orders and take them to your lab to be completed. If you no show your scheduled appointment three times, you will be dismissed from this practice. Reschedules must be completed 24 hours prior to your schedule appointment. If the list below has been completed, PLEASE FAX RECORDS TO OUR OFFICE @ [phone].  Once the records have been received we will update your records at our office:  Health Maintenance Due   Topic Date Due    COVID-19 Vaccine (1) Never done    Pneumococcal 65+ years Vaccine (1 - PCV) Never done    Hepatitis C screen  Never done    DTaP/Tdap/Td vaccine (1 - Tdap) Never done    Colorectal Cancer Screen  Never done    Shingles vaccine (1 of 2) Never done    AAA screen  Never done    Annual Wellness Visit (AWV)  Never done    Flu vaccine (1) Never done          Tobacco Cessation Programs     Telephonic behavior modification  1-800-QUIT-NOW (613-3337)  Counseling service for those who are ready to quit using tobacco.    Available for uninsured [place] residents, Medicaid recipients, pregnant women, or patients whose health plans or employers are members of the [facility]

## 2023-09-14 NOTE — PROGRESS NOTES
Health Maintenance Due   Topic Date Due    COVID-19 Vaccine (1) Never done    Pneumococcal 65+ years Vaccine (1 - PCV) Never done    Hepatitis C screen  Never done    DTaP/Tdap/Td vaccine (1 - Tdap) Never done    Colorectal Cancer Screen  Never done    Shingles vaccine (1 of 2) Never done    AAA screen  Never done    Annual Wellness Visit (AWV)  Never done    Flu vaccine (1) Never done

## 2023-10-09 ENCOUNTER — OFFICE VISIT (OUTPATIENT)
Dept: NEUROLOGY | Age: 73
End: 2023-10-09
Payer: MEDICARE

## 2023-10-09 VITALS
BODY MASS INDEX: 25.98 KG/M2 | OXYGEN SATURATION: 97 % | HEIGHT: 73 IN | SYSTOLIC BLOOD PRESSURE: 122 MMHG | DIASTOLIC BLOOD PRESSURE: 80 MMHG | HEART RATE: 74 BPM | WEIGHT: 196 LBS

## 2023-10-09 DIAGNOSIS — R56.9 SEIZURE (HCC): Primary | ICD-10-CM

## 2023-10-09 DIAGNOSIS — R56.9 ALCOHOL RELATED SEIZURE (HCC): ICD-10-CM

## 2023-10-09 PROCEDURE — 3017F COLORECTAL CA SCREEN DOC REV: CPT | Performed by: NURSE PRACTITIONER

## 2023-10-09 PROCEDURE — G8484 FLU IMMUNIZE NO ADMIN: HCPCS | Performed by: NURSE PRACTITIONER

## 2023-10-09 PROCEDURE — 1124F ACP DISCUSS-NO DSCNMKR DOCD: CPT | Performed by: NURSE PRACTITIONER

## 2023-10-09 PROCEDURE — 4004F PT TOBACCO SCREEN RCVD TLK: CPT | Performed by: NURSE PRACTITIONER

## 2023-10-09 PROCEDURE — G8427 DOCREV CUR MEDS BY ELIG CLIN: HCPCS | Performed by: NURSE PRACTITIONER

## 2023-10-09 PROCEDURE — 99213 OFFICE O/P EST LOW 20 MIN: CPT | Performed by: NURSE PRACTITIONER

## 2023-10-09 PROCEDURE — G8417 CALC BMI ABV UP PARAM F/U: HCPCS | Performed by: NURSE PRACTITIONER

## 2023-10-09 RX ORDER — LAMOTRIGINE 150 MG/1
150 TABLET ORAL DAILY
Qty: 30 TABLET | Refills: 3 | Status: SHIPPED | OUTPATIENT
Start: 2023-10-09

## 2023-10-09 NOTE — PROGRESS NOTES
NEUROLOGY OUT PATIENT FOLLOW UP NOTE:  10/9/309173:25 AM    Sheba Berger is here for follow up for seizure, alcohol related seizure. No Known Allergies    Current Outpatient Medications:     lamoTRIgine (LAMICTAL) 25 MG tablet, Start Lamictal 25 mg daily for 2 weeks then 50 mg daily for 2 weeks then 100 mg daily thereafter. Report any rash., Disp: 120 tablet, Rfl: 1    lisinopril (PRINIVIL;ZESTRIL) 10 MG tablet, Take 1 tablet by mouth daily, Disp: 90 tablet, Rfl: 1    atorvastatin (LIPITOR) 20 MG tablet, Take 1 tablet by mouth daily (Patient not taking: Reported on 10/9/2023), Disp: 90 tablet, Rfl: 1    I reviewed the past medical history, allergies, medications, social history and family history. PE:   Vitals:    10/09/23 1018   BP: 122/80   Site: Left Upper Arm   Position: Sitting   Cuff Size: Large Adult   Pulse: 74   SpO2: 97%   Weight: 196 lb (88.9 kg)   Height: 6' 1\" (1.854 m)     General Appearance:  alert and cooperative  Gen: NAD, Language is Intact. Skin: no rash, lesion, dry  to touch. warm  Head: no rash, no icterus  Neck: There is no carotid bruits. The Neck is supple. Neuro: CN 2-12 grossly intact with no focal deficits. Power 5/5 in left upper and bilateral lower extremities, 4/5 in right upper extremity (due to shoulder injury). Reflexes are +2 symmetric. Long tracts are intact. Cerebellar exam is Intact. Sensory exam is intact to light touch. Gait is intact.   Musculoskeletal:  Has no hand arthritis, no limitation of ROM in any of the four extremities,   Lower extremities no edema          DATA:         Results for orders placed or performed during the hospital encounter of 08/09/23   Lipid Panel   Result Value Ref Range    Cholesterol, Total 203 (H) 100 - 199 mg/dL    Triglycerides 95 0 - 199 mg/dL    HDL 46 mg/dL    LDL Calculated 138 mg/dL   TSH   Result Value Ref Range    TSH 1.770 0.400 - 4.200 uIU/mL   Hemoglobin A1C   Result Value Ref Range    Hemoglobin A1C 5.9 4.4 - 6.4 %

## 2024-01-12 RX ORDER — LAMOTRIGINE 150 MG/1
150 TABLET ORAL DAILY
Qty: 30 TABLET | Refills: 3 | Status: SHIPPED | OUTPATIENT
Start: 2024-01-12

## 2024-01-12 NOTE — TELEPHONE ENCOUNTER
Please approve or deny     Last Visit Date:  10/9/2023   Paige Leopold     Next Visit Date:    2/9/2024  Dr. Puente

## 2024-01-25 ENCOUNTER — OFFICE VISIT (OUTPATIENT)
Dept: FAMILY MEDICINE CLINIC | Age: 74
End: 2024-01-25

## 2024-01-25 VITALS
RESPIRATION RATE: 20 BRPM | BODY MASS INDEX: 26.45 KG/M2 | DIASTOLIC BLOOD PRESSURE: 86 MMHG | TEMPERATURE: 98.1 F | WEIGHT: 199.6 LBS | OXYGEN SATURATION: 99 % | HEART RATE: 89 BPM | SYSTOLIC BLOOD PRESSURE: 136 MMHG | HEIGHT: 73 IN

## 2024-01-25 DIAGNOSIS — E78.2 MIXED HYPERLIPIDEMIA: ICD-10-CM

## 2024-01-25 DIAGNOSIS — Z12.11 ENCOUNTER FOR SCREENING COLONOSCOPY: ICD-10-CM

## 2024-01-25 DIAGNOSIS — Z00.00 INITIAL MEDICARE ANNUAL WELLNESS VISIT: Primary | ICD-10-CM

## 2024-01-25 DIAGNOSIS — I10 PRIMARY HYPERTENSION: ICD-10-CM

## 2024-01-25 DIAGNOSIS — Z11.59 NEED FOR HEPATITIS C SCREENING TEST: ICD-10-CM

## 2024-01-25 RX ORDER — LISINOPRIL 10 MG/1
10 TABLET ORAL DAILY
Qty: 90 TABLET | Refills: 1 | Status: SHIPPED | OUTPATIENT
Start: 2024-01-25

## 2024-01-25 RX ORDER — ATORVASTATIN CALCIUM 20 MG/1
20 TABLET, FILM COATED ORAL DAILY
Qty: 90 TABLET | Refills: 1 | Status: SHIPPED | OUTPATIENT
Start: 2024-01-25

## 2024-01-25 ASSESSMENT — ENCOUNTER SYMPTOMS
SORE THROAT: 0
ABDOMINAL PAIN: 0
SHORTNESS OF BREATH: 0
BLOOD IN STOOL: 0
VOMITING: 0
COUGH: 0
RHINORRHEA: 0
NAUSEA: 0
DIARRHEA: 0
BACK PAIN: 0
PHOTOPHOBIA: 0

## 2024-01-25 NOTE — PROGRESS NOTES
[facility] MEDICINE PRACTICE  [address]  [facility] [address]  Dept: [phone]  Dept Fax: [phone]  JAKUB Anderson is a 73 y.o.male    Pt presents for Medicare Annual Wellness physical exam.      Patient has no acute complaints, chronic medical conditions stable. Has not been checking his BP at home. 136/86 here today.. He did not get previously ordered labs or low-dose lung CT, but plans to do so. Has decreased smoking from 1.5ppd to 0.5ppd. Patient has been working on his diet by cutting down on sweets.         Wt Readings from Last 3 Encounters:   01/25/24 90.5 kg (199 lb 9.6 oz)   10/09/23 88.9 kg (196 lb)   09/14/23 89.3 kg (196 lb 12.8 oz)   Weight increased 3# since last visit 3 months ago.      Pt stable since last visit- no new problems for diagnoses listed below:  Patient Active Problem List   Diagnosis    Closed fracture dislocation of right shoulder, initial encounter    Traumatic closed displaced fracture of right shoulder with anterior dislocation, initial encounter    Loss of consciousness (HCC)    Seizures (HCC)       Review of Systems   Constitutional:  Negative for chills, fever and unexpected weight change.   HENT:  Negative for congestion, rhinorrhea and sore throat.    Eyes:  Negative for photophobia and visual disturbance.   Respiratory:  Negative for cough and shortness of breath.    Cardiovascular:  Negative for chest pain and palpitations.   Gastrointestinal:  Negative for abdominal pain, blood in stool, diarrhea, nausea and vomiting.   Genitourinary:  Negative for dysuria, frequency, hematuria and urgency.   Musculoskeletal:  Negative for back pain and neck pain.   Skin:  Negative for pallor and rash.   Neurological:  Negative for weakness, numbness and headaches.         OBJECTIVE     /86 (Site: Right Upper Arm, Position: Sitting, Cuff Size: Medium Adult)   Pulse 89   Temp 98.1 °F (36.7 °C) (Oral)   Resp 20   Ht 1.854 m (6' 1\")  
. PRECEPTOR NOTE:    S: 73 y.o. male with   Chief Complaint   Patient presents with    Medicare AWV       HPI: please see resident note for HPI and ROS.  Patient presents for AWV. Labs ordered, not yet completed.     BP Readings from Last 3 Encounters:   01/25/24 136/86   10/09/23 122/80   09/14/23 136/84     Wt Readings from Last 3 Encounters:   01/25/24 90.5 kg (199 lb 9.6 oz)   10/09/23 88.9 kg (196 lb)   09/14/23 89.3 kg (196 lb 12.8 oz)       O: VS:  height is 1.854 m (6' 1\") and weight is 90.5 kg (199 lb 9.6 oz). His oral temperature is 98.1 °F (36.7 °C). His blood pressure is 136/86 and his pulse is 89. His respiration is 20 and oxygen saturation is 99%.       Please see resident's note for complete physical exam     Diagnosis Orders   1. Initial Medicare annual wellness visit        2. Mixed hyperlipidemia        3. Primary hypertension        4. Need for hepatitis C screening test  Hepatitis C Antibody      5. Other problems related to lifestyle            Plan:  Please refer to resident note for full plan.  Recommend completing previously ordered LDCT and labs  AAA screening ordered today  Agreeable to checking with pharmacy for RSV, Shingles, and Tdap  Refer for colon cancer screening       Return in 4 weeks (on 2/22/2024).    Health Maintenance Due   Topic Date Due    COVID-19 Vaccine (1) Never done    Pneumococcal 65+ years Vaccine (1 - PCV) Never done    Hepatitis C screen  Never done    DTaP/Tdap/Td vaccine (1 - Tdap) Never done    Colorectal Cancer Screen  Never done    Shingles vaccine (1 of 2) Never done    Respiratory Syncytial Virus (RSV) Pregnant or age 60 yrs+ (1 - 1-dose 60+ series) Never done    AAA screen  Never done    Flu vaccine (1) Never done     I have discussed the case, including pertinent history and exam findings with the resident and attending physician.   I agree with the documented assessment and plan as documented by the resident.      Olivia Lucero MD 1/25/2024 2:21 PM    
S: 73 y.o. male with   Chief Complaint   Patient presents with    Medicare AWV       HPI: please see resident note for HPI and ROS.    BP Readings from Last 3 Encounters:   01/25/24 136/86   10/09/23 122/80   09/14/23 136/84     Wt Readings from Last 3 Encounters:   01/25/24 90.5 kg (199 lb 9.6 oz)   10/09/23 88.9 kg (196 lb)   09/14/23 89.3 kg (196 lb 12.8 oz)       O: VS:  height is 1.854 m (6' 1\") and weight is 90.5 kg (199 lb 9.6 oz). His oral temperature is 98.1 °F (36.7 °C). His blood pressure is 136/86 and his pulse is 89. His respiration is 20 and oxygen saturation is 99%.        Diagnosis Orders   1. Initial Medicare annual wellness visit  Vascular AAA screening    Echo Hurt MD, Gastroenterology, [facility]      2. Mixed hyperlipidemia  atorvastatin (LIPITOR) 20 MG tablet      3. Primary hypertension  lisinopril (PRINIVIL;ZESTRIL) 10 MG tablet      4. Need for hepatitis C screening test  Hepatitis C Antibody      5. Encounter for screening colonoscopy  Echo Hurt MD, Gastroenterology, [facility]          Plan:  Please refer to resident note for full plan.    73-year-old male here for Medicare AWV. Chronic medical conditions are stable. Medication refilled today. Labs and LDCT ordered previously; not yet completed. US for AAA screening ordered today. Referral placed for colonoscopy. Encouraged vaccines at pharmacy. Declined influenza vaccine today. Recommend smoking cessation as able; has cut back on his own. Dietary changes and exercise encouraged for overall health. Follow up in 4 weeks for lab/imaging review.     Health Maintenance Due   Topic Date Due    COVID-19 Vaccine (1) Never done    Pneumococcal 65+ years Vaccine (1 - PCV) Never done    Hepatitis C screen  Never done    DTaP/Tdap/Td vaccine (1 - Tdap) Never done    Colorectal Cancer Screen  Never done    Shingles vaccine (1 of 2) Never done    Respiratory Syncytial Virus (RSV) Pregnant or age 60 yrs+ (1 - 1-dose 60+ series) Never done    
found in Flowsheets. The following problems were reviewed today and where indicated follow up appointments were made and/or referrals ordered.    Positive Risk Factor Screenings with Interventions:                       Tobacco Use:  Tobacco Use: High Risk (1/25/2024)    Patient History     Smoking Tobacco Use: Every Day     Smokeless Tobacco Use: Never     Passive Exposure: Not on file     E-cigarette/Vaping       Questions Responses    E-cigarette/Vaping Use Never User    Start Date     Passive Exposure     Quit Date     Counseling Given     Comments         Interventions:  Patient comments: patient has decreased his smoking intake, continues to cut back              Objective   Vitals:    01/25/24 1337   BP: 136/86   Site: Right Upper Arm   Position: Sitting   Cuff Size: Medium Adult   Pulse: 89   Resp: 20   Temp: 98.1 °F (36.7 °C)   TempSrc: Oral   SpO2: 99%   Weight: 90.5 kg (199 lb 9.6 oz)   Height: 1.854 m (6' 1\")      Body mass index is 26.33 kg/m².      Physical Exam  Constitutional:       General: He is not in acute distress.     Appearance: Normal appearance. He is normal weight. He is not ill-appearing, toxic-appearing or diaphoretic.   HENT:      Head: Normocephalic and atraumatic.      Right Ear: External ear normal.      Left Ear: External ear normal.      Nose: Nose normal.      Mouth/Throat:      Mouth: Mucous membranes are moist.   Eyes:      Extraocular Movements: Extraocular movements intact.      Conjunctiva/sclera: Conjunctivae normal.      Pupils: Pupils are equal, round, and reactive to light.   Cardiovascular:      Rate and Rhythm: Normal rate and regular rhythm.      Pulses: Normal pulses.      Heart sounds: Normal heart sounds. No murmur heard.     No friction rub. No gallop.   Pulmonary:      Effort: Pulmonary effort is normal. No respiratory distress.      Breath sounds: Normal breath sounds. No stridor. No wheezing, rhonchi or rales.   Chest:      Chest wall: No tenderness.   Abdominal:

## 2024-02-09 ENCOUNTER — OFFICE VISIT (OUTPATIENT)
Dept: NEUROLOGY | Age: 74
End: 2024-02-09
Payer: MEDICARE

## 2024-02-09 VITALS
WEIGHT: 191 LBS | BODY MASS INDEX: 25.31 KG/M2 | DIASTOLIC BLOOD PRESSURE: 71 MMHG | SYSTOLIC BLOOD PRESSURE: 122 MMHG | HEIGHT: 73 IN | OXYGEN SATURATION: 95 % | HEART RATE: 55 BPM

## 2024-02-09 DIAGNOSIS — R56.9 SEIZURE (HCC): Primary | ICD-10-CM

## 2024-02-09 DIAGNOSIS — R56.9 ALCOHOL RELATED SEIZURE (HCC): ICD-10-CM

## 2024-02-09 PROCEDURE — 4004F PT TOBACCO SCREEN RCVD TLK: CPT | Performed by: PSYCHIATRY & NEUROLOGY

## 2024-02-09 PROCEDURE — 99213 OFFICE O/P EST LOW 20 MIN: CPT | Performed by: PSYCHIATRY & NEUROLOGY

## 2024-02-09 PROCEDURE — G8484 FLU IMMUNIZE NO ADMIN: HCPCS | Performed by: PSYCHIATRY & NEUROLOGY

## 2024-02-09 PROCEDURE — 3017F COLORECTAL CA SCREEN DOC REV: CPT | Performed by: PSYCHIATRY & NEUROLOGY

## 2024-02-09 PROCEDURE — 1124F ACP DISCUSS-NO DSCNMKR DOCD: CPT | Performed by: PSYCHIATRY & NEUROLOGY

## 2024-02-09 PROCEDURE — G8417 CALC BMI ABV UP PARAM F/U: HCPCS | Performed by: PSYCHIATRY & NEUROLOGY

## 2024-02-09 PROCEDURE — G8427 DOCREV CUR MEDS BY ELIG CLIN: HCPCS | Performed by: PSYCHIATRY & NEUROLOGY

## 2024-02-09 RX ORDER — LAMOTRIGINE 150 MG/1
150 TABLET ORAL DAILY
Qty: 30 TABLET | Refills: 5 | Status: SHIPPED | OUTPATIENT
Start: 2024-02-09

## 2024-02-09 NOTE — PATIENT INSTRUCTIONS
Lamictal 150 mg daily, report any rash.    Continue avoiding alcohol as discussed.  Call with any new symptoms or concerns.   Follow up in 6 months

## 2024-02-09 NOTE — PROGRESS NOTES
NEUROLOGY OUT PATIENT FOLLOW UP NOTE:  2/9/20249:03 AM    Saleem Anderson is here for follow up for seizure, alcohol related seizure. He is here to go over plan.          No Known Allergies    Current Outpatient Medications:     atorvastatin (LIPITOR) 20 MG tablet, Take 1 tablet by mouth daily, Disp: 90 tablet, Rfl: 1    lisinopril (PRINIVIL;ZESTRIL) 10 MG tablet, Take 1 tablet by mouth daily, Disp: 90 tablet, Rfl: 1    lamoTRIgine (LAMICTAL) 150 MG tablet, Take 1 tablet by mouth daily, Disp: 30 tablet, Rfl: 3    I reviewed the past medical history, allergies, medications, social history and family history.       PE:   Vitals:    02/09/24 0907   BP: 122/71   Site: Right Upper Arm   Position: Sitting   Cuff Size: Medium Adult   Pulse: 55   SpO2: 95%   Weight: 86.6 kg (191 lb)   Height: 1.854 m (6' 1\")       General Appearance:  alert and cooperative  Gen: NAD, Language is Intact. Skin: no rash, lesion, dry  to touch. warm  Head: no rash, no icterus  Neck: The Neck is supple.    Neuro: CN 2-12 grossly intact with no focal deficits. Power 5/5 in left upper and bilateral lower extremities, 4/5 in right upper extremity (due to shoulder injury).   Reflexes are symmetric. Long tracts are intact. Cerebellar exam is Intact. Sensory exam is intact to light touch.  Gait is intact.  Musculoskeletal:  Has no hand arthritis, no limitation of ROM in any of the four extremities,   Lower extremities no edema          DATA:         Results for orders placed or performed during the hospital encounter of 08/09/23   Lipid Panel   Result Value Ref Range    Cholesterol, Total 203 (H) 100 - 199 mg/dL    Triglycerides 95 0 - 199 mg/dL    HDL 46 mg/dL    LDL Calculated 138 mg/dL   TSH   Result Value Ref Range    TSH 1.770 0.400 - 4.200 uIU/mL   Hemoglobin A1C   Result Value Ref Range    Hemoglobin A1C 5.9 4.4 - 6.4 %    Estimated Avg Glucose 117 70 - 126 mg/dL   Levetiracetam Level   Result Value Ref Range    Levetiracetam Lvl 7 (L) 10 - 40

## 2024-05-07 RX ORDER — LAMOTRIGINE 150 MG/1
150 TABLET ORAL DAILY
Qty: 30 TABLET | Refills: 5 | OUTPATIENT
Start: 2024-05-07

## 2024-07-21 DIAGNOSIS — I10 PRIMARY HYPERTENSION: ICD-10-CM

## 2024-07-21 DIAGNOSIS — E78.2 MIXED HYPERLIPIDEMIA: ICD-10-CM

## 2024-07-22 RX ORDER — ATORVASTATIN CALCIUM 20 MG/1
20 TABLET, FILM COATED ORAL DAILY
Qty: 90 TABLET | Refills: 1 | Status: SHIPPED | OUTPATIENT
Start: 2024-07-22

## 2024-07-22 RX ORDER — LISINOPRIL 10 MG/1
10 TABLET ORAL DAILY
Qty: 90 TABLET | Refills: 1 | Status: SHIPPED | OUTPATIENT
Start: 2024-07-22

## 2024-07-22 NOTE — TELEPHONE ENCOUNTER
Patient's last appointment was : 1/25/2024 with our   Patient's next appointment is : Visit date not found   Last refilled on: 1-25-24  Which pharmacy does the script need sent to: [facility]      Lab Results   Component Value Date    LABA1C 5.9 08/09/2023     Lab Results   Component Value Date    CHOL 203 (H) 08/09/2023    TRIG 95 08/09/2023    HDL 46 08/09/2023     Lab Results   Component Value Date     05/28/2023    K 4.3 05/28/2023     05/28/2023    CO2 23 05/28/2023    BUN 18 05/28/2023    CREATININE 0.9 05/28/2023    GLUCOSE 150 (H) 05/28/2023    CALCIUM 9.5 05/28/2023    BILITOT 0.2 (L) 05/28/2023    ALKPHOS 98 05/28/2023    AST 15 05/28/2023    ALT 14 05/28/2023    LABGLOM >60 05/28/2023     Lab Results   Component Value Date    TSH 1.770 08/09/2023     Lab Results   Component Value Date    WBC 5.6 05/28/2023    HGB 14.2 05/28/2023    HCT 43.8 05/28/2023    MCV 93.4 05/28/2023     05/28/2023

## 2024-09-10 RX ORDER — LAMOTRIGINE 150 MG/1
150 TABLET ORAL DAILY
Qty: 30 TABLET | Refills: 5 | Status: SHIPPED | OUTPATIENT
Start: 2024-09-10 | End: 2024-09-12 | Stop reason: SDUPTHER

## 2024-09-12 ENCOUNTER — OFFICE VISIT (OUTPATIENT)
Dept: NEUROLOGY | Age: 74
End: 2024-09-12
Payer: MEDICARE

## 2024-09-12 VITALS
WEIGHT: 197 LBS | HEART RATE: 74 BPM | BODY MASS INDEX: 26.11 KG/M2 | HEIGHT: 73 IN | SYSTOLIC BLOOD PRESSURE: 146 MMHG | DIASTOLIC BLOOD PRESSURE: 80 MMHG | OXYGEN SATURATION: 99 %

## 2024-09-12 DIAGNOSIS — R56.9 ALCOHOL RELATED SEIZURE (HCC): Primary | ICD-10-CM

## 2024-09-12 DIAGNOSIS — R56.9 SEIZURE (HCC): ICD-10-CM

## 2024-09-12 PROCEDURE — 99213 OFFICE O/P EST LOW 20 MIN: CPT | Performed by: NURSE PRACTITIONER

## 2024-09-12 PROCEDURE — G8427 DOCREV CUR MEDS BY ELIG CLIN: HCPCS | Performed by: NURSE PRACTITIONER

## 2024-09-12 PROCEDURE — 4004F PT TOBACCO SCREEN RCVD TLK: CPT | Performed by: NURSE PRACTITIONER

## 2024-09-12 PROCEDURE — G8417 CALC BMI ABV UP PARAM F/U: HCPCS | Performed by: NURSE PRACTITIONER

## 2024-09-12 PROCEDURE — 1124F ACP DISCUSS-NO DSCNMKR DOCD: CPT | Performed by: NURSE PRACTITIONER

## 2024-09-12 PROCEDURE — 3017F COLORECTAL CA SCREEN DOC REV: CPT | Performed by: NURSE PRACTITIONER

## 2024-09-12 RX ORDER — LAMOTRIGINE 150 MG/1
150 TABLET ORAL DAILY
Qty: 90 TABLET | Refills: 2 | Status: SHIPPED | OUTPATIENT
Start: 2024-09-12

## 2024-11-05 ENCOUNTER — HOSPITAL ENCOUNTER (EMERGENCY)
Age: 74
Discharge: HOME | End: 2024-11-05
Attending: EMERGENCY MEDICINE
Payer: MEDICARE

## 2024-11-05 ENCOUNTER — APPOINTMENT (OUTPATIENT)
Dept: GENERAL RADIOLOGY | Age: 74
End: 2024-11-05
Payer: MEDICARE

## 2024-11-05 VITALS
WEIGHT: 195 LBS | SYSTOLIC BLOOD PRESSURE: 128 MMHG | HEART RATE: 92 BPM | DIASTOLIC BLOOD PRESSURE: 81 MMHG | BODY MASS INDEX: 25.73 KG/M2 | OXYGEN SATURATION: 95 % | RESPIRATION RATE: 18 BRPM

## 2024-11-05 DIAGNOSIS — R56.9 SEIZURE (HCC): Primary | ICD-10-CM

## 2024-11-05 LAB
ALBUMIN SERPL BCG-MCNC: 3.9 G/DL (ref 3.5–5.1)
ALP SERPL-CCNC: 83 U/L (ref 38–126)
ALT SERPL W/O P-5'-P-CCNC: 11 U/L (ref 11–66)
ANION GAP SERPL CALC-SCNC: 17 MEQ/L (ref 8–16)
AST SERPL-CCNC: 13 U/L (ref 5–40)
BASOPHILS ABSOLUTE: 0 THOU/MM3 (ref 0–0.1)
BASOPHILS NFR BLD AUTO: 0.5 %
BILIRUB SERPL-MCNC: 0.4 MG/DL (ref 0.3–1.2)
BUN SERPL-MCNC: 14 MG/DL (ref 7–22)
CALCIUM SERPL-MCNC: 9.1 MG/DL (ref 8.5–10.5)
CHLORIDE SERPL-SCNC: 101 MEQ/L (ref 98–111)
CO2 SERPL-SCNC: 22 MEQ/L (ref 23–33)
CREAT SERPL-MCNC: 1.1 MG/DL (ref 0.4–1.2)
DEPRECATED RDW RBC AUTO: 44 FL (ref 35–45)
EKG ATRIAL RATE: 116 BPM
EKG P AXIS: 77 DEGREES
EKG P-R INTERVAL: 178 MS
EKG Q-T INTERVAL: 334 MS
EKG QRS DURATION: 86 MS
EKG QTC CALCULATION (BAZETT): 464 MS
EKG R AXIS: 29 DEGREES
EKG T AXIS: 81 DEGREES
EKG VENTRICULAR RATE: 116 BPM
EOSINOPHIL NFR BLD AUTO: 1.3 %
EOSINOPHILS ABSOLUTE: 0.1 THOU/MM3 (ref 0–0.4)
ERYTHROCYTE [DISTWIDTH] IN BLOOD BY AUTOMATED COUNT: 12.7 % (ref 11.5–14.5)
ETHANOL SERPL-MCNC: < 0.01 % (ref 0–0.08)
FLUAV RNA RESP QL NAA+PROBE: NOT DETECTED
FLUBV RNA RESP QL NAA+PROBE: NOT DETECTED
GFR SERPL CREATININE-BSD FRML MDRD: 71 ML/MIN/1.73M2
GLUCOSE SERPL-MCNC: 174 MG/DL (ref 70–108)
HCT VFR BLD AUTO: 45.5 % (ref 42–52)
HGB BLD-MCNC: 15.2 GM/DL (ref 14–18)
IMM GRANULOCYTES # BLD AUTO: 0.04 THOU/MM3 (ref 0–0.07)
IMM GRANULOCYTES NFR BLD AUTO: 0.5 %
LYMPHOCYTES ABSOLUTE: 3.6 THOU/MM3 (ref 1–4.8)
LYMPHOCYTES NFR BLD AUTO: 43.6 %
MAGNESIUM SERPL-MCNC: 2 MG/DL (ref 1.6–2.4)
MCH RBC QN AUTO: 31.4 PG (ref 26–33)
MCHC RBC AUTO-ENTMCNC: 33.4 GM/DL (ref 32.2–35.5)
MCV RBC AUTO: 94 FL (ref 80–94)
MONOCYTES ABSOLUTE: 0.6 THOU/MM3 (ref 0.4–1.3)
MONOCYTES NFR BLD AUTO: 7.3 %
NEUTROPHILS ABSOLUTE: 3.9 THOU/MM3 (ref 1.8–7.7)
NEUTROPHILS NFR BLD AUTO: 46.8 %
NRBC BLD AUTO-RTO: 0 /100 WBC
OSMOLALITY SERPL CALC.SUM OF ELEC: 284.1 MOSMOL/KG (ref 275–300)
PLATELET # BLD AUTO: 246 THOU/MM3 (ref 130–400)
PMV BLD AUTO: 9.4 FL (ref 9.4–12.4)
POTASSIUM SERPL-SCNC: 3.7 MEQ/L (ref 3.5–5.2)
PROT SERPL-MCNC: 6.8 G/DL (ref 6.1–8)
RBC # BLD AUTO: 4.84 MILL/MM3 (ref 4.7–6.1)
SARS-COV-2 RNA RESP QL NAA+PROBE: NOT DETECTED
SODIUM SERPL-SCNC: 140 MEQ/L (ref 135–145)
TROPONIN, HIGH SENSITIVITY: 12 NG/L (ref 0–12)
WBC # BLD AUTO: 8.3 THOU/MM3 (ref 4.8–10.8)

## 2024-11-05 PROCEDURE — 84484 ASSAY OF TROPONIN QUANT: CPT

## 2024-11-05 PROCEDURE — 83735 ASSAY OF MAGNESIUM: CPT

## 2024-11-05 PROCEDURE — 36415 COLL VENOUS BLD VENIPUNCTURE: CPT

## 2024-11-05 PROCEDURE — 82077 ASSAY SPEC XCP UR&BREATH IA: CPT

## 2024-11-05 PROCEDURE — 80053 COMPREHEN METABOLIC PANEL: CPT

## 2024-11-05 PROCEDURE — 80175 DRUG SCREEN QUAN LAMOTRIGINE: CPT

## 2024-11-05 PROCEDURE — 99285 EMERGENCY DEPT VISIT HI MDM: CPT

## 2024-11-05 PROCEDURE — 96374 THER/PROPH/DIAG INJ IV PUSH: CPT

## 2024-11-05 PROCEDURE — 93005 ELECTROCARDIOGRAM TRACING: CPT

## 2024-11-05 PROCEDURE — 87636 SARSCOV2 & INF A&B AMP PRB: CPT

## 2024-11-05 PROCEDURE — 6360000002 HC RX W HCPCS

## 2024-11-05 PROCEDURE — 71045 X-RAY EXAM CHEST 1 VIEW: CPT

## 2024-11-05 PROCEDURE — 93010 ELECTROCARDIOGRAM REPORT: CPT | Performed by: NUCLEAR MEDICINE

## 2024-11-05 PROCEDURE — 6370000000 HC RX 637 (ALT 250 FOR IP)

## 2024-11-05 PROCEDURE — 85025 COMPLETE CBC W/AUTO DIFF WBC: CPT

## 2024-11-05 RX ORDER — LEVETIRACETAM 500 MG/5ML
1500 INJECTION, SOLUTION, CONCENTRATE INTRAVENOUS ONCE
Status: COMPLETED | OUTPATIENT
Start: 2024-11-05 | End: 2024-11-05

## 2024-11-05 RX ORDER — LORAZEPAM 2 MG/ML
INJECTION INTRAMUSCULAR
Status: COMPLETED
Start: 2024-11-05 | End: 2024-11-05

## 2024-11-05 RX ADMIN — LEVETIRACETAM 1500 MG: 100 INJECTION INTRAVENOUS at 11:56

## 2024-11-05 RX ADMIN — LAMOTRIGINE 150 MG: 25 TABLET ORAL at 12:54

## 2024-11-05 RX ADMIN — LORAZEPAM: 2 INJECTION, SOLUTION INTRAMUSCULAR; INTRAVENOUS at 11:00

## 2024-11-05 NOTE — DISCHARGE INSTRUCTIONS
Please call your neurologist to make a follow up appointment      Take an anti-seizure medication, then take that medication as previously indicated and prescribed.  Do not miss any doses.    Do not drive any vehicles or operate any heavy machinery for a period of 6 months after having a seizure.  If you are caught driving and have had a seizure, then you could possible go to nursing home.    PLEASE RETURN TO THE EMERGENCY DEPARTMENT IMMEDIATELY for worsening symptoms, any seizure lasting for more than 5 minutes,  having multiple seizures in a row,  or if you develop any concerning symptoms such as: high fever not relieved by acetaminophen (Tylenol) and/or ibuprofen (Motrin / Advil), chills, shortness of breath, chest pain, feeling of your heart fluttering or racing, persistent nausea and/or vomiting, vomiting up blood, blood in your stool, loss of consciousness, numbness, weakness or tingling in the arms or legs or change in color of the extremities, changes in mental status, persistent headache, blurry vision loss of bladder / bowel control, unable to follow up with your physician, or other any other care or concern.

## 2024-11-05 NOTE — ED TRIAGE NOTES
Pt to ED post seizure. Arrives agitated and unable to hold still. EMS states that they were unable to get vital signs. Unable to redirect patient. Pt will not leave vital equipment on. Dr. Madrid at bedside. Pt arrived with his pants saturated with urine.

## 2024-11-05 NOTE — ED PROVIDER NOTES
ATTENDING NOTE:    I supervised and discussed the history, physical exam and the management of this patient with the resident. I reviewed the resident's note and agree with the documented findings and plan of care.  Please see my additional note.    I personally saw and examined the patient.  I have reviewed and agree with the resident's findings, including all diagnostic interpretations and treatment plans as written.  I was present for the key portion of any procedures performed and the inclusive time noted in any critical care statement.    Electronically verified by Jacquelyn Bates MD  11/08/24 8621    
Lamictal.   Plan with discharge follow-up with neurology take daily Lamictal return precautions provided    ED COURSE   ED Medications administered this visit (None if left blank):   Medications   LORazepam (ATIVAN) 2 MG/ML injection (  Given 11/5/24 1100)   levETIRAcetam (KEPPRA) injection 1,500 mg (1,500 mg IntraVENous Given 11/5/24 1156)   lamoTRIgine (LAMICTAL) tablet 150 mg (150 mg Oral Given 11/5/24 1254)       ED Course as of 11/05/24 2050 Tue Nov 05, 2024   1225 XR CHEST PORTABLE  IMPRESSION:  1. Normal heart size. No effusion. Prior surgery proximal right humerus.  2. Mild interstitial pneumonitis/fibrosis both lung bases.      [SJ]   1301 COVID-19 & Influenza Combo:    SARS-CoV-2 RNA, RT PCR NOT DETECTED   Influenza A NOT DETECTED   Influenza B NOT DETECTED  Negative COVID and flu [SJ]      ED Course User Index  [SJ] Matt Madrid DO       Procedures: (None if left blank)  Procedures:     Consultants:  None    Decision Rules/Clinical Scores utilized:  Not Applicable     CRITICAL CARE:  None    MEDICATION CHANGES     Discharge Medication List as of 11/5/2024  2:25 PM          FINAL IMPRESSION     Final diagnoses:   Seizure (HCC)       DISPOSITION   Condition: condition: fair  Dispo: Discharge to home  DISPOSITION Decision To Discharge 11/05/2024 02:12:04 PM             Outpatient Follow-Up:  Adina Puente MD  [address] [address]  [phone]    In 1 day      [facility] EMERGENCY DEPT  [address] [address]  [phone]  Go to   As needed      DISCHARGE MEDICATIONS:  Discharge Medication List as of 11/5/2024  2:25 PM            This transcription was electronically signed. Parts of this transcriptions may have been dictated by use of voice recognition software and electronically transcribed. The transcription may contain errors not detected in proofreading. Please refer to my supervising physician's documentation if my documentation differs.    Electronically

## 2024-11-05 NOTE — ED NOTES
Daughter at bedside states that the patient has not had a seizure in a few months. States that normally he is restless. Daughter states that the current state and agitation of the patient is abnormal. Daughter states that yesterday the patient was complaining he was very fatigued and laid around all day.

## 2024-11-05 NOTE — ED NOTES
Patient appears to be less agitated. Is able to be redirected. Is alert and oriented x3. Pt remains confused on what happened at why he is at the hospital. Pt denies any pain at this time. Family at bedside.

## 2024-11-05 NOTE — ED NOTES
Pt resting on cot. No distress noted. RR even and unlabored. Family at bedside. Call light in reach. Pt denies any needs at this time.

## 2024-11-05 NOTE — ED NOTES
Pt changed out of wet pants into clean scrub pants. Pt states that he is feeling slightly lightheaded. Pt was able to stand without assistance.

## 2024-11-06 LAB — LAMOTRIGINE SERPL-MCNC: < 0.9 UG/ML (ref 3–15)

## 2025-01-16 DIAGNOSIS — E78.2 MIXED HYPERLIPIDEMIA: ICD-10-CM

## 2025-01-16 RX ORDER — ATORVASTATIN CALCIUM 20 MG/1
20 TABLET, FILM COATED ORAL DAILY
Qty: 30 TABLET | Refills: 0 | Status: SHIPPED | OUTPATIENT
Start: 2025-01-16

## 2025-01-16 NOTE — TELEPHONE ENCOUNTER
Patient's last appointment was: 1/25/2024  with our   Patient's next appointment is: Visit date not found  with our DrAnn   Last refilled on: 10/19/2024  Which pharmacy does the script need sent to: [facility] [address]      Lab Results   Component Value Date    LABA1C 5.9 08/09/2023     Lab Results   Component Value Date    CHOL 203 (H) 08/09/2023    TRIG 95 08/09/2023    HDL 46 08/09/2023     Lab Results   Component Value Date     11/05/2024    K 3.7 11/05/2024     11/05/2024    CO2 22 (L) 11/05/2024    BUN 14 11/05/2024    CREATININE 1.1 11/05/2024    GLUCOSE 174 (H) 11/05/2024    CALCIUM 9.1 11/05/2024    BILITOT 0.4 11/05/2024    ALKPHOS 83 11/05/2024    AST 13 11/05/2024    ALT 11 11/05/2024    LABGLOM 71 11/05/2024     Lab Results   Component Value Date    TSH 1.770 08/09/2023     Lab Results   Component Value Date    WBC 8.3 11/05/2024    HGB 15.2 11/05/2024    HCT 45.5 11/05/2024    MCV 94.0 11/05/2024     11/05/2024

## 2025-01-22 DIAGNOSIS — I10 PRIMARY HYPERTENSION: ICD-10-CM

## 2025-01-22 RX ORDER — LISINOPRIL 10 MG/1
10 TABLET ORAL DAILY
Qty: 30 TABLET | Refills: 0 | Status: SHIPPED | OUTPATIENT
Start: 2025-01-22

## 2025-01-22 NOTE — TELEPHONE ENCOUNTER
Patient's last appointment was: 1/25/2024  with our   Patient's next appointment is: Visit date not found  with our DrAnn   Last refilled on: 10/18/2024   Which pharmacy does the script need sent to:   [id number] [place] [facility] [place] [facility]          Lab Results   Component Value Date    LABA1C 5.9 08/09/2023     Lab Results   Component Value Date    CHOL 203 (H) 08/09/2023    TRIG 95 08/09/2023    HDL 46 08/09/2023     Lab Results   Component Value Date     11/05/2024    K 3.7 11/05/2024     11/05/2024    CO2 22 (L) 11/05/2024    BUN 14 11/05/2024    CREATININE 1.1 11/05/2024    GLUCOSE 174 (H) 11/05/2024    CALCIUM 9.1 11/05/2024    BILITOT 0.4 11/05/2024    ALKPHOS 83 11/05/2024    AST 13 11/05/2024    ALT 11 11/05/2024    LABGLOM 71 11/05/2024     Lab Results   Component Value Date    TSH 1.770 08/09/2023     Lab Results   Component Value Date    WBC 8.3 11/05/2024    HGB 15.2 11/05/2024    HCT 45.5 11/05/2024    MCV 94.0 11/05/2024     11/05/2024

## 2025-02-19 DIAGNOSIS — I10 PRIMARY HYPERTENSION: ICD-10-CM

## 2025-02-19 DIAGNOSIS — E78.2 MIXED HYPERLIPIDEMIA: ICD-10-CM

## 2025-02-19 RX ORDER — LISINOPRIL 10 MG/1
10 TABLET ORAL DAILY
Qty: 30 TABLET | Refills: 0 | Status: SHIPPED | OUTPATIENT
Start: 2025-02-19

## 2025-02-19 RX ORDER — ATORVASTATIN CALCIUM 20 MG/1
20 TABLET, FILM COATED ORAL DAILY
Qty: 30 TABLET | Refills: 0 | Status: SHIPPED | OUTPATIENT
Start: 2025-02-19

## 2025-02-19 NOTE — TELEPHONE ENCOUNTER
Patient's last appointment was: 1/25/2024  with our   Patient's next appointment is: Visit date not found  with our DrAnn   Last refilled on: 1/22/2025   Which pharmacy does the script need sent to:   [id number] [place] [facility]       Lab Results   Component Value Date    LABA1C 5.9 08/09/2023     Lab Results   Component Value Date    CHOL 203 (H) 08/09/2023    TRIG 95 08/09/2023    HDL 46 08/09/2023     Lab Results   Component Value Date     11/05/2024    K 3.7 11/05/2024     11/05/2024    CO2 22 (L) 11/05/2024    BUN 14 11/05/2024    CREATININE 1.1 11/05/2024    GLUCOSE 174 (H) 11/05/2024    CALCIUM 9.1 11/05/2024    BILITOT 0.4 11/05/2024    ALKPHOS 83 11/05/2024    AST 13 11/05/2024    ALT 11 11/05/2024    LABGLOM 71 11/05/2024     Lab Results   Component Value Date    TSH 1.770 08/09/2023     Lab Results   Component Value Date    WBC 8.3 11/05/2024    HGB 15.2 11/05/2024    HCT 45.5 11/05/2024    MCV 94.0 11/05/2024     11/05/2024

## 2025-02-26 DIAGNOSIS — I10 PRIMARY HYPERTENSION: ICD-10-CM

## 2025-02-26 DIAGNOSIS — E78.2 MIXED HYPERLIPIDEMIA: ICD-10-CM

## 2025-02-26 RX ORDER — LISINOPRIL 10 MG/1
10 TABLET ORAL DAILY
Qty: 30 TABLET | Refills: 0 | OUTPATIENT
Start: 2025-02-26

## 2025-02-26 RX ORDER — ATORVASTATIN CALCIUM 20 MG/1
20 TABLET, FILM COATED ORAL DAILY
Qty: 30 TABLET | Refills: 0 | OUTPATIENT
Start: 2025-02-26

## 2025-02-27 ENCOUNTER — OFFICE VISIT (OUTPATIENT)
Dept: FAMILY MEDICINE CLINIC | Age: 75
End: 2025-02-27

## 2025-02-27 VITALS
RESPIRATION RATE: 20 BRPM | HEART RATE: 83 BPM | TEMPERATURE: 97.6 F | WEIGHT: 204.2 LBS | OXYGEN SATURATION: 98 % | DIASTOLIC BLOOD PRESSURE: 90 MMHG | BODY MASS INDEX: 27.06 KG/M2 | SYSTOLIC BLOOD PRESSURE: 140 MMHG | HEIGHT: 73 IN

## 2025-02-27 DIAGNOSIS — R73.09 ELEVATED GLUCOSE LEVEL: ICD-10-CM

## 2025-02-27 DIAGNOSIS — I10 PRIMARY HYPERTENSION: ICD-10-CM

## 2025-02-27 DIAGNOSIS — E78.2 MIXED HYPERLIPIDEMIA: ICD-10-CM

## 2025-02-27 DIAGNOSIS — F17.200 SMOKING: Primary | ICD-10-CM

## 2025-02-27 RX ORDER — ATORVASTATIN CALCIUM 20 MG/1
20 TABLET, FILM COATED ORAL DAILY
Qty: 30 TABLET | Refills: 0 | Status: SHIPPED | OUTPATIENT
Start: 2025-02-27

## 2025-02-27 RX ORDER — LAMOTRIGINE 150 MG/1
150 TABLET ORAL DAILY
Qty: 90 TABLET | Refills: 2 | Status: CANCELLED | OUTPATIENT
Start: 2025-02-27

## 2025-02-27 RX ORDER — LISINOPRIL 10 MG/1
10 TABLET ORAL DAILY
Qty: 30 TABLET | Refills: 0 | Status: SHIPPED | OUTPATIENT
Start: 2025-02-27

## 2025-02-27 SDOH — ECONOMIC STABILITY: FOOD INSECURITY: WITHIN THE PAST 12 MONTHS, YOU WORRIED THAT YOUR FOOD WOULD RUN OUT BEFORE YOU GOT MONEY TO BUY MORE.: PATIENT DECLINED

## 2025-02-27 SDOH — ECONOMIC STABILITY: FOOD INSECURITY: WITHIN THE PAST 12 MONTHS, THE FOOD YOU BOUGHT JUST DIDN'T LAST AND YOU DIDN'T HAVE MONEY TO GET MORE.: PATIENT DECLINED

## 2025-02-27 ASSESSMENT — ENCOUNTER SYMPTOMS
SHORTNESS OF BREATH: 0
CONSTIPATION: 0
CHEST TIGHTNESS: 0
ABDOMINAL PAIN: 0
DIARRHEA: 0
COUGH: 0
NAUSEA: 0
VOMITING: 0

## 2025-02-28 NOTE — PROGRESS NOTES
Patient:Saleem Anderson  YOB: 1950   MRN:[id number]    Subjective   74 y.o. male who presents for the following: Medication Refill (Patient in office today to discuss medication refills.)    Having some dizziness in the morning after getting up. Lasts about 3-4 minutes and goes away. He is following with Dr. Bernard for eye injections and notes the dizzziness started since the eye injections.     Declined all vaccines. Declined colonoscopy.       Review of Systems   Constitutional:  Negative for activity change, appetite change, chills, diaphoresis and fatigue.   Respiratory:  Negative for cough, chest tightness and shortness of breath.    Cardiovascular:  Negative for chest pain.   Gastrointestinal:  Negative for abdominal pain, constipation, diarrhea, nausea and vomiting.     PMHx: He has no past medical history on file.    Objective     Vitals:    02/27/25 0958   BP: (!) 140/90   Site: Left Upper Arm   Position: Sitting   Cuff Size: Medium Adult   Pulse: 83   Resp: 20   Temp: 97.6 °F (36.4 °C)   TempSrc: Oral   SpO2: 98%   Weight: 92.6 kg (204 lb 3.2 oz)   Height: 1.854 m (6' 1\")   Body mass index is 26.94 kg/m².    Physical Exam  Constitutional:       General: He is not in acute distress.     Appearance: Normal appearance. He is normal weight. He is not ill-appearing, toxic-appearing or diaphoretic.   HENT:      Head: Normocephalic and atraumatic.      Right Ear: External ear normal.      Left Ear: External ear normal.   Cardiovascular:      Rate and Rhythm: Normal rate and regular rhythm.      Pulses: Normal pulses.      Heart sounds: Normal heart sounds. No murmur heard.     No friction rub. No gallop.   Pulmonary:      Effort: Pulmonary effort is normal. No respiratory distress.      Breath sounds: Normal breath sounds. No stridor. No wheezing, rhonchi or rales.   Musculoskeletal:         General: Normal range of motion.   Skin:     General: Skin is warm and dry.   Neurological:      Mental 
Physician Statement  I have discussed the case, including pertinent history and exam findings with the resident.  I agree with the documented assessment and plan as documented by the resident.        Trinity Portillo MD 2/28/2025 12:53 PM

## 2025-03-18 ENCOUNTER — HOSPITAL ENCOUNTER (OUTPATIENT)
Age: 75
Discharge: HOME | End: 2025-03-18
Payer: MEDICARE

## 2025-03-18 DIAGNOSIS — R73.09 ELEVATED GLUCOSE LEVEL: ICD-10-CM

## 2025-03-18 DIAGNOSIS — I10 PRIMARY HYPERTENSION: ICD-10-CM

## 2025-03-18 DIAGNOSIS — E78.2 MIXED HYPERLIPIDEMIA: ICD-10-CM

## 2025-03-18 LAB
ANION GAP SERPL CALC-SCNC: 11 MEQ/L (ref 8–16)
BUN SERPL-MCNC: 19 MG/DL (ref 8–23)
CALCIUM SERPL-MCNC: 9.4 MG/DL (ref 8.8–10.2)
CHLORIDE SERPL-SCNC: 104 MEQ/L (ref 98–111)
CHOLEST SERPL-MCNC: 161 MG/DL (ref 100–199)
CO2 SERPL-SCNC: 25 MEQ/L (ref 22–29)
CREAT SERPL-MCNC: 1 MG/DL (ref 0.7–1.2)
CREAT UR-MCNC: 218 MG/DL
DEPRECATED MEAN GLUCOSE BLD GHB EST-ACNC: 108 MG/DL (ref 70–126)
GFR SERPL CREATININE-BSD FRML MDRD: 79 ML/MIN/1.73M2
GLUCOSE SERPL-MCNC: 108 MG/DL (ref 74–109)
HBA1C MFR BLD HPLC: 5.6 % (ref 4–6)
HDLC SERPL-MCNC: 52 MG/DL
LDLC SERPL CALC-MCNC: 90 MG/DL
MICROALBUMIN UR-MCNC: < 2 MG/DL
MICROALBUMIN/CREAT RATIO PNL UR: 9 MG/G (ref 0–30)
POTASSIUM SERPL-SCNC: 4.6 MEQ/L (ref 3.5–5.2)
SODIUM SERPL-SCNC: 140 MEQ/L (ref 135–145)
TRIGL SERPL-MCNC: 96 MG/DL (ref 0–199)

## 2025-03-18 PROCEDURE — 83036 HEMOGLOBIN GLYCOSYLATED A1C: CPT

## 2025-03-18 PROCEDURE — 80048 BASIC METABOLIC PNL TOTAL CA: CPT

## 2025-03-18 PROCEDURE — 82043 UR ALBUMIN QUANTITATIVE: CPT

## 2025-03-18 PROCEDURE — 36415 COLL VENOUS BLD VENIPUNCTURE: CPT

## 2025-03-18 PROCEDURE — 80061 LIPID PANEL: CPT

## 2025-03-19 ENCOUNTER — RESULTS FOLLOW-UP (OUTPATIENT)
Dept: FAMILY MEDICINE CLINIC | Age: 75
End: 2025-03-19

## 2025-03-31 DIAGNOSIS — E78.2 MIXED HYPERLIPIDEMIA: ICD-10-CM

## 2025-03-31 DIAGNOSIS — I10 PRIMARY HYPERTENSION: ICD-10-CM

## 2025-03-31 NOTE — TELEPHONE ENCOUNTER
Patient's last appointment was: 2/27/2025  with our   Patient's next appointment is: 5/27/2025  with our Dr. Watson  Last refilled on: 02/27/2025  Which pharmacy does the script need sent to: [facility]      Lab Results   Component Value Date    LABA1C 5.6 03/18/2025     Lab Results   Component Value Date    CHOL 161 03/18/2025    TRIG 96 03/18/2025    HDL 52 03/18/2025     Lab Results   Component Value Date     03/18/2025    K 4.6 03/18/2025     03/18/2025    CO2 25 03/18/2025    BUN 19 03/18/2025    CREATININE 1.0 03/18/2025    GLUCOSE 108 03/18/2025    CALCIUM 9.4 03/18/2025    BILITOT 0.4 11/05/2024    ALKPHOS 83 11/05/2024    AST 13 11/05/2024    ALT 11 11/05/2024    LABGLOM 79 03/18/2025     Lab Results   Component Value Date    TSH 1.770 08/09/2023     Lab Results   Component Value Date    WBC 8.3 11/05/2024    HGB 15.2 11/05/2024    HCT 45.5 11/05/2024    MCV 94.0 11/05/2024     11/05/2024

## 2025-04-01 RX ORDER — LISINOPRIL 10 MG/1
10 TABLET ORAL DAILY
Qty: 30 TABLET | Refills: 0 | Status: SHIPPED | OUTPATIENT
Start: 2025-04-01

## 2025-04-01 RX ORDER — ATORVASTATIN CALCIUM 20 MG/1
20 TABLET, FILM COATED ORAL DAILY
Qty: 30 TABLET | Refills: 0 | Status: SHIPPED | OUTPATIENT
Start: 2025-04-01

## 2025-04-09 DIAGNOSIS — E78.2 MIXED HYPERLIPIDEMIA: ICD-10-CM

## 2025-04-09 RX ORDER — ATORVASTATIN CALCIUM 20 MG/1
20 TABLET, FILM COATED ORAL DAILY
Qty: 90 TABLET | OUTPATIENT
Start: 2025-04-09

## 2025-04-10 RX ORDER — ATORVASTATIN CALCIUM 20 MG/1
20 TABLET, FILM COATED ORAL DAILY
Qty: 90 TABLET | Refills: 0 | Status: SHIPPED | OUTPATIENT
Start: 2025-04-10

## 2025-04-29 DIAGNOSIS — I10 PRIMARY HYPERTENSION: ICD-10-CM

## 2025-04-29 RX ORDER — LISINOPRIL 10 MG/1
10 TABLET ORAL DAILY
Qty: 90 TABLET | Refills: 0 | Status: SHIPPED | OUTPATIENT
Start: 2025-04-29

## 2025-05-12 ENCOUNTER — OFFICE VISIT (OUTPATIENT)
Dept: NEUROLOGY | Age: 75
End: 2025-05-12
Payer: MEDICARE

## 2025-05-12 VITALS
HEIGHT: 73 IN | SYSTOLIC BLOOD PRESSURE: 150 MMHG | HEART RATE: 71 BPM | RESPIRATION RATE: 16 BRPM | WEIGHT: 201 LBS | BODY MASS INDEX: 26.64 KG/M2 | OXYGEN SATURATION: 99 % | DIASTOLIC BLOOD PRESSURE: 80 MMHG

## 2025-05-12 DIAGNOSIS — R56.9 ALCOHOL RELATED SEIZURE (HCC): ICD-10-CM

## 2025-05-12 DIAGNOSIS — R56.9 SEIZURE (HCC): Primary | ICD-10-CM

## 2025-05-12 PROCEDURE — 4004F PT TOBACCO SCREEN RCVD TLK: CPT | Performed by: NURSE PRACTITIONER

## 2025-05-12 PROCEDURE — 1159F MED LIST DOCD IN RCRD: CPT | Performed by: NURSE PRACTITIONER

## 2025-05-12 PROCEDURE — 3017F COLORECTAL CA SCREEN DOC REV: CPT | Performed by: NURSE PRACTITIONER

## 2025-05-12 PROCEDURE — G8417 CALC BMI ABV UP PARAM F/U: HCPCS | Performed by: NURSE PRACTITIONER

## 2025-05-12 PROCEDURE — G8427 DOCREV CUR MEDS BY ELIG CLIN: HCPCS | Performed by: NURSE PRACTITIONER

## 2025-05-12 PROCEDURE — 1124F ACP DISCUSS-NO DSCNMKR DOCD: CPT | Performed by: NURSE PRACTITIONER

## 2025-05-12 PROCEDURE — 99214 OFFICE O/P EST MOD 30 MIN: CPT | Performed by: NURSE PRACTITIONER

## 2025-05-12 RX ORDER — LAMOTRIGINE 150 MG/1
150 TABLET ORAL DAILY
Qty: 90 TABLET | Refills: 2 | Status: SHIPPED | OUTPATIENT
Start: 2025-05-12

## 2025-05-12 NOTE — PROGRESS NOTES
NEUROLOGY OUT PATIENT FOLLOW UP NOTE:  5/12/20258:59 AM    Saleem Anderson is here for follow up for seizure, alcohol related seizure.            No Known Allergies    Current Outpatient Medications:     lisinopril (PRINIVIL;ZESTRIL) 10 MG tablet, Take 1 tablet by mouth daily, Disp: 90 tablet, Rfl: 0    atorvastatin (LIPITOR) 20 MG tablet, Take 1 tablet by mouth daily, Disp: 90 tablet, Rfl: 0    lamoTRIgine (LAMICTAL) 150 MG tablet, Take 1 tablet by mouth daily, Disp: 90 tablet, Rfl: 2    I reviewed the past medical history, allergies, medications, social history and family history.       PE:   Vitals:    05/12/25 0845   BP: (!) 150/80   BP Site: Right Upper Arm   Patient Position: Sitting   BP Cuff Size: Medium Adult   Pulse: 71   Resp: 16   SpO2: 99%   Weight: 91.2 kg (201 lb)   Height: 1.854 m (6' 1\")     General Appearance:  alert and cooperative  Gen: NAD, Language is Intact. Skin: no rash, lesion, dry  to touch. warm  Head: no rash, no icterus  Neck: The Neck is supple.    Neuro: CN 2-12 grossly intact with no focal deficits. Power 5/5 in left upper and bilateral lower extremities, 4/5 in right upper extremity (due to shoulder injury).   Reflexes are symmetric. Long tracts are intact. Cerebellar exam is Intact. Sensory exam is intact to light touch.  Gait is intact.  Musculoskeletal:  Has no hand arthritis, no limitation of ROM in any of the four extremities,   Lower extremities no edema          DATA:         Results for orders placed or performed during the hospital encounter of 03/18/25   Albumin/Creatinine Ratio, Urine   Result Value Ref Range    Albumin Urine < 2.00 mg/dL    Creatinine, Ur 218.0 mg/dL    Albumin/Creatinine Ratio 9 0 - 30 mg/g   Lipid Panel   Result Value Ref Range    Cholesterol, Total 161 100 - 199 mg/dL    Triglycerides 96 0 - 199 mg/dL    HDL 52 mg/dL    LDL Cholesterol 90 mg/dL   Hemoglobin A1C   Result Value Ref Range    Hemoglobin A1C 5.6 4.0 - 6.0 %    Estimated Avg Glucose 108 70 -

## 2025-05-12 NOTE — PATIENT INSTRUCTIONS
Continue with Lamictal 150 mg daily, report any rash.  Refills given  Take your medications consistently as prescribed  Continue avoiding alcohol as discussed.  You need to stop smoking as discussed.   NO driving, swimming, operating heavy machinery or compromising heights until event free for 6 months. Report any new events. Call if any questions.  Call with any new symptoms or concerns.   Follow up in 6 months

## 2025-05-28 DIAGNOSIS — I10 PRIMARY HYPERTENSION: ICD-10-CM

## 2025-05-29 RX ORDER — LISINOPRIL 10 MG/1
10 TABLET ORAL DAILY
Qty: 30 TABLET | Refills: 5 | Status: SHIPPED | OUTPATIENT
Start: 2025-05-29

## 2025-06-17 ENCOUNTER — OFFICE VISIT (OUTPATIENT)
Dept: FAMILY MEDICINE CLINIC | Age: 75
End: 2025-06-17
Payer: MEDICARE

## 2025-06-17 VITALS
SYSTOLIC BLOOD PRESSURE: 144 MMHG | BODY MASS INDEX: 26.06 KG/M2 | RESPIRATION RATE: 16 BRPM | WEIGHT: 196.6 LBS | HEART RATE: 82 BPM | HEIGHT: 73 IN | OXYGEN SATURATION: 97 % | DIASTOLIC BLOOD PRESSURE: 82 MMHG

## 2025-06-17 DIAGNOSIS — F32.1 CURRENT MODERATE EPISODE OF MAJOR DEPRESSIVE DISORDER WITHOUT PRIOR EPISODE (HCC): Primary | ICD-10-CM

## 2025-06-17 DIAGNOSIS — G89.29 OTHER CHRONIC PAIN: ICD-10-CM

## 2025-06-17 PROCEDURE — 1124F ACP DISCUSS-NO DSCNMKR DOCD: CPT

## 2025-06-17 PROCEDURE — G8427 DOCREV CUR MEDS BY ELIG CLIN: HCPCS

## 2025-06-17 PROCEDURE — G8417 CALC BMI ABV UP PARAM F/U: HCPCS

## 2025-06-17 PROCEDURE — 1159F MED LIST DOCD IN RCRD: CPT

## 2025-06-17 PROCEDURE — 3017F COLORECTAL CA SCREEN DOC REV: CPT

## 2025-06-17 PROCEDURE — 4004F PT TOBACCO SCREEN RCVD TLK: CPT

## 2025-06-17 PROCEDURE — 99214 OFFICE O/P EST MOD 30 MIN: CPT

## 2025-06-17 ASSESSMENT — PATIENT HEALTH QUESTIONNAIRE - PHQ9
SUM OF ALL RESPONSES TO PHQ QUESTIONS 1-9: 2
SUM OF ALL RESPONSES TO PHQ QUESTIONS 1-9: 2
1. LITTLE INTEREST OR PLEASURE IN DOING THINGS: SEVERAL DAYS
SUM OF ALL RESPONSES TO PHQ QUESTIONS 1-9: 2
SUM OF ALL RESPONSES TO PHQ QUESTIONS 1-9: 2
2. FEELING DOWN, DEPRESSED OR HOPELESS: SEVERAL DAYS

## 2025-06-17 ASSESSMENT — ENCOUNTER SYMPTOMS
DIARRHEA: 0
NAUSEA: 0
COUGH: 0
CHEST TIGHTNESS: 0
VOMITING: 0
ABDOMINAL PAIN: 0
SHORTNESS OF BREATH: 0
CONSTIPATION: 0

## 2025-06-17 NOTE — PROGRESS NOTES
S: 74 y.o. male with   Chief Complaint   Patient presents with    Discuss Medications     Patient would like to discuss medications for inflammation and depression.       Chief complaint, [place], and all pertinent details of the case reviewed with the resident.    Please see resident's note for specific details discussed at today's visit.  Here with daughter and grandson.  BP Readings from Last 3 Encounters:   06/17/25 (!) 144/82   05/12/25 (!) 150/80   02/27/25 (!) 140/90     Wt Readings from Last 3 Encounters:   06/17/25 89.2 kg (196 lb 9.6 oz)   05/12/25 91.2 kg (201 lb)   02/27/25 92.6 kg (204 lb 3.2 oz)       O: VS:  height is 1.854 m (6' 1\") and weight is 89.2 kg (196 lb 9.6 oz). His blood pressure is 144/82 (abnormal) and his pulse is 82. His respiration is 16 and oxygen saturation is 97%.   AAO/NAD, appropriate affect for mood, both smiled and teared up at times  A:     Diagnosis Orders   1. Current moderate episode of major depressive disorder without prior episode (HCC)  sertraline (ZOLOFT) 50 MG tablet    TSH reflex to FT4    melatonin (RA MELATONIN) 3 MG TABS tablet      2. Other chronic pain  diclofenac sodium (VOLTAREN) 1 % GEL          Plan:  Will start zoloft- 25 mg, then up to 50 in 1 week  Gave book- God will carry you through  Check TSH  Could use voltaren for shoulder  Melatonin  for sleep  F/u in 4-6 weeks    Health Maintenance Due   Topic Date Due    Hepatitis C screen  Never done    DTaP/Tdap/Td vaccine (1 - Tdap) Never done    Pneumococcal 50+ years Vaccine (1 of 2 - PCV) Never done    Colorectal Cancer Screen  Never done    Shingles vaccine (1 of 2) Never done    AAA screen  Never done    COVID-19 Vaccine (1 - 2024-25 season) Never done    Annual Wellness Visit (Medicare Advantage)  01/01/2025       Attending Physician Statement  I have discussed the case, including pertinent history and exam findings with the resident. I also have seen the patient and performed key portions of the 
Normal heart sounds. No murmur heard.     No friction rub. No gallop.   Pulmonary:      Effort: Pulmonary effort is normal. No respiratory distress.      Breath sounds: Normal breath sounds. No stridor. No wheezing, rhonchi or rales.   Musculoskeletal:         General: Normal range of motion.   Skin:     General: Skin is warm and dry.   Neurological:      Mental Status: He is alert and oriented to person, place, and time.   Psychiatric:         Attention and Perception: Attention and perception normal.         Mood and Affect: Mood is depressed. Affect is tearful.         Speech: Speech normal.         Behavior: Behavior normal. Behavior is cooperative.         Thought Content: Thought content includes suicidal ideation.       Most Recent Data:  Lab Results   Component Value Date    WBC 8.3 11/05/2024    HGB 15.2 11/05/2024    HCT 45.5 11/05/2024     11/05/2024    CHOL 161 03/18/2025    TRIG 96 03/18/2025    HDL 52 03/18/2025    ALT 11 11/05/2024    AST 13 11/05/2024     03/18/2025     03/18/2025    K 4.6 03/18/2025    CREATININE 1.0 03/18/2025    BUN 19 03/18/2025    CO2 25 03/18/2025    TSH 1.770 08/09/2023    INR 0.91 05/29/2023    LABA1C 5.6 03/18/2025     XR CHEST PORTABLE  Narrative: PROCEDURE: XR CHEST PORTABLE    CLINICAL INFORMATION: seizure    COMPARISON: No prior study.    TECHNIQUE: A single mobile view of the chest was obtained.  Impression: 1. Normal heart size. No effusion. Prior surgery proximal right humerus.  2. Mild interstitial pneumonitis/fibrosis both lung bases.    **This report has been created using voice recognition software.  It may contain  minor errors which are inherent in voice recognition technology.**    Electronically signed by Dr. Tu Peterson    Current Outpatient Medications   Medication Instructions    atorvastatin (LIPITOR) 20 mg, Oral, DAILY    diclofenac sodium (VOLTAREN) 4 g, Topical, 4 TIMES DAILY    lamoTRIgine (LAMICTAL) 150 mg, Oral, DAILY

## 2025-07-21 ENCOUNTER — OFFICE VISIT (OUTPATIENT)
Dept: FAMILY MEDICINE CLINIC | Age: 75
End: 2025-07-21
Payer: MEDICARE

## 2025-07-21 VITALS
DIASTOLIC BLOOD PRESSURE: 81 MMHG | BODY MASS INDEX: 25.82 KG/M2 | RESPIRATION RATE: 15 BRPM | SYSTOLIC BLOOD PRESSURE: 127 MMHG | WEIGHT: 194.8 LBS | HEIGHT: 73 IN | HEART RATE: 76 BPM | OXYGEN SATURATION: 96 %

## 2025-07-21 DIAGNOSIS — F32.1 CURRENT MODERATE EPISODE OF MAJOR DEPRESSIVE DISORDER WITHOUT PRIOR EPISODE (HCC): Primary | ICD-10-CM

## 2025-07-21 DIAGNOSIS — Z72.0 TOBACCO USE: ICD-10-CM

## 2025-07-21 PROCEDURE — G8417 CALC BMI ABV UP PARAM F/U: HCPCS

## 2025-07-21 PROCEDURE — 4004F PT TOBACCO SCREEN RCVD TLK: CPT

## 2025-07-21 PROCEDURE — 1124F ACP DISCUSS-NO DSCNMKR DOCD: CPT

## 2025-07-21 PROCEDURE — G8427 DOCREV CUR MEDS BY ELIG CLIN: HCPCS

## 2025-07-21 PROCEDURE — 1159F MED LIST DOCD IN RCRD: CPT

## 2025-07-21 PROCEDURE — 3017F COLORECTAL CA SCREEN DOC REV: CPT

## 2025-07-21 PROCEDURE — 99214 OFFICE O/P EST MOD 30 MIN: CPT

## 2025-07-21 PROCEDURE — 1160F RVW MEDS BY RX/DR IN RCRD: CPT

## 2025-07-21 RX ORDER — ESCITALOPRAM OXALATE 10 MG/1
10 TABLET ORAL DAILY
Qty: 30 TABLET | Refills: 3 | Status: SHIPPED | OUTPATIENT
Start: 2025-07-21

## 2025-07-21 ASSESSMENT — PATIENT HEALTH QUESTIONNAIRE - PHQ9
2. FEELING DOWN, DEPRESSED OR HOPELESS: NOT AT ALL
9. THOUGHTS THAT YOU WOULD BE BETTER OFF DEAD, OR OF HURTING YOURSELF: NOT AT ALL
4. FEELING TIRED OR HAVING LITTLE ENERGY: SEVERAL DAYS
8. MOVING OR SPEAKING SO SLOWLY THAT OTHER PEOPLE COULD HAVE NOTICED. OR THE OPPOSITE, BEING SO FIGETY OR RESTLESS THAT YOU HAVE BEEN MOVING AROUND A LOT MORE THAN USUAL: NOT AT ALL
SUM OF ALL RESPONSES TO PHQ QUESTIONS 1-9: 1
SUM OF ALL RESPONSES TO PHQ QUESTIONS 1-9: 1
7. TROUBLE CONCENTRATING ON THINGS, SUCH AS READING THE NEWSPAPER OR WATCHING TELEVISION: NOT AT ALL
6. FEELING BAD ABOUT YOURSELF - OR THAT YOU ARE A FAILURE OR HAVE LET YOURSELF OR YOUR FAMILY DOWN: NOT AT ALL
5. POOR APPETITE OR OVEREATING: NOT AT ALL
3. TROUBLE FALLING OR STAYING ASLEEP: NOT AT ALL
10. IF YOU CHECKED OFF ANY PROBLEMS, HOW DIFFICULT HAVE THESE PROBLEMS MADE IT FOR YOU TO DO YOUR WORK, TAKE CARE OF THINGS AT HOME, OR GET ALONG WITH OTHER PEOPLE: NOT DIFFICULT AT ALL
SUM OF ALL RESPONSES TO PHQ QUESTIONS 1-9: 1
1. LITTLE INTEREST OR PLEASURE IN DOING THINGS: NOT AT ALL
SUM OF ALL RESPONSES TO PHQ QUESTIONS 1-9: 1

## 2025-07-21 NOTE — PROGRESS NOTES
S: 74 y.o. male with   Chief Complaint   Patient presents with    Follow-up     Patient in office for follow up visit for antidepressant  . Pt is experiencing \" serious migraines \" has not taken medication this week .  When pt is taking entire pill not just the half medication will experience migraine .  Patient Denies any other questions or concerns at this time .        HPI: please see resident note for HPI and ROS.    BP Readings from Last 3 Encounters:   07/21/25 127/81   06/17/25 (!) 144/82   05/12/25 (!) 150/80     Wt Readings from Last 3 Encounters:   07/21/25 88.4 kg (194 lb 12.8 oz)   06/17/25 89.2 kg (196 lb 9.6 oz)   05/12/25 91.2 kg (201 lb)       O: VS:  height is 1.854 m (6' 0.99\") and weight is 88.4 kg (194 lb 12.8 oz). His blood pressure is 127/81 and his pulse is 76. His respiration is 15 and oxygen saturation is 96%.   AAO/NAD, appropriate affect for mood  CV:  RRR, no murmur  Resp: CTAB     Diagnosis Orders   1. Current moderate episode of major depressive disorder without prior episode (HCC)  escitalopram (LEXAPRO) 10 MG tablet      2. Tobacco use  CT LUNG CANCER SCREENING (INITIAL/ANNUAL)    US SCREENING FOR AAA          Plan:  Please refer to resident note for full plan.    74 year old male presents to the office for follow up. Started on zoloft about 1 month ago, 25mg and then 50mg. Noticed more headaches with 50mg dose. Patient stopped medication, and since stopping headaches improved. Will plan to start lexapro 10mg daily, to see if this can help patients mood without side effects and follow up.     Ct lung screening and AAA screening discussed, patient would like to complete this. Will plan to complete both of these and follow up on results.     Health Maintenance Due   Topic Date Due    Hepatitis C screen  Never done    DTaP/Tdap/Td vaccine (1 - Tdap) Never done    Pneumococcal 50+ years Vaccine (1 of 2 - PCV) Never done    Colorectal Cancer Screen  Never done    Shingles vaccine (1 of 2) 
alcohol per week. He reports that he does not use drugs.     Past Surgical History:   Procedure Laterality Date    CLAVICLE SURGERY Right 03/12/2023    CLOSED SHOULDER REDUCTION performed by Lowell Kelly MD at [facility] OR    HUMERUS FRACTURE SURGERY Right 03/16/2023    RIGHT PROXIMAL HUMERUS ORIF performed by Werner Taylor DO at [facility] OR    MANDIBLE SURGERY      broken jaw      Family History   Problem Relation Age of Onset    Diabetes Mother     Brain Cancer Sister 76    COPD Sister     Cancer Half-Brother     Cancer Half-Brother     No Known Problems Half-Brother     No Known Problems Half-Sister     No Known Problems Half-Sister        Labs & Imaging   Most Recent Labs:  Lab Results   Component Value Date    WBC 8.3 11/05/2024    HGB 15.2 11/05/2024    HCT 45.5 11/05/2024     11/05/2024    CHOL 161 03/18/2025    TRIG 96 03/18/2025    HDL 52 03/18/2025    ALT 11 11/05/2024    AST 13 11/05/2024     03/18/2025     03/18/2025    K 4.6 03/18/2025    CREATININE 1.0 03/18/2025    BUN 19 03/18/2025    CO2 25 03/18/2025    TSH 1.770 08/09/2023    INR 0.91 05/29/2023    LABA1C 5.6 03/18/2025     Most Recent Imaging:  XR CHEST PORTABLE  Narrative: PROCEDURE: XR CHEST PORTABLE    CLINICAL INFORMATION: seizure    COMPARISON: No prior study.    TECHNIQUE: A single mobile view of the chest was obtained.  Impression: 1. Normal heart size. No effusion. Prior surgery proximal right humerus.  2. Mild interstitial pneumonitis/fibrosis both lung bases.    **This report has been created using voice recognition software.  It may contain  minor errors which are inherent in voice recognition technology.**    Electronically signed by Dr. Tu Peterson    Data   I have reviewed: active problem list, medication list, notes from last encounter, lab results    BP Readings from Last 3 Encounters:   07/21/25 127/81   06/17/25 (!) 144/82   05/12/25 (!) 150/80     Wt Readings from Last 3 Encounters:   07/21/25 88.4 kg (194

## 2025-08-12 ENCOUNTER — TELEPHONE (OUTPATIENT)
Dept: FAMILY MEDICINE CLINIC | Age: 75
End: 2025-08-12

## 2025-08-22 ENCOUNTER — OFFICE VISIT (OUTPATIENT)
Dept: FAMILY MEDICINE CLINIC | Age: 75
End: 2025-08-22
Payer: MEDICARE

## 2025-08-22 VITALS
RESPIRATION RATE: 16 BRPM | BODY MASS INDEX: 26.16 KG/M2 | HEIGHT: 73 IN | DIASTOLIC BLOOD PRESSURE: 72 MMHG | WEIGHT: 197.4 LBS | HEART RATE: 70 BPM | SYSTOLIC BLOOD PRESSURE: 118 MMHG | OXYGEN SATURATION: 98 %

## 2025-08-22 DIAGNOSIS — F41.9 ANXIETY: ICD-10-CM

## 2025-08-22 DIAGNOSIS — G47.09 OTHER INSOMNIA: ICD-10-CM

## 2025-08-22 DIAGNOSIS — F32.1 CURRENT MODERATE EPISODE OF MAJOR DEPRESSIVE DISORDER WITHOUT PRIOR EPISODE (HCC): Primary | ICD-10-CM

## 2025-08-22 DIAGNOSIS — H40.9 GLAUCOMA OF LEFT EYE, UNSPECIFIED GLAUCOMA TYPE: ICD-10-CM

## 2025-08-22 PROCEDURE — 99214 OFFICE O/P EST MOD 30 MIN: CPT

## 2025-08-22 PROCEDURE — 1124F ACP DISCUSS-NO DSCNMKR DOCD: CPT

## 2025-08-22 PROCEDURE — G8427 DOCREV CUR MEDS BY ELIG CLIN: HCPCS

## 2025-08-22 PROCEDURE — G8417 CALC BMI ABV UP PARAM F/U: HCPCS

## 2025-08-22 PROCEDURE — 1159F MED LIST DOCD IN RCRD: CPT

## 2025-08-22 PROCEDURE — 3017F COLORECTAL CA SCREEN DOC REV: CPT

## 2025-08-22 PROCEDURE — 4004F PT TOBACCO SCREEN RCVD TLK: CPT

## 2025-08-22 RX ORDER — TRAZODONE HYDROCHLORIDE 50 MG/1
50 TABLET ORAL NIGHTLY PRN
Qty: 30 TABLET | Refills: 0 | Status: SHIPPED | OUTPATIENT
Start: 2025-08-22

## (undated) DEVICE — PERI-LOC K-WIRE 1.6MM X 150MM                                    LENGTH TROCAR POINT
Type: IMPLANTABLE DEVICE | Site: ARM | Status: NON-FUNCTIONAL
Brand: PERI-LOC
Removed: 2023-03-16

## (undated) DEVICE — DRESSING TRNSPAR W8XL12IN FLM SURESITE 123

## (undated) DEVICE — PACK PROCEDURE SURG SET UP SRMC

## (undated) DEVICE — HYPODERMIC SAFETY NEEDLE: Brand: MAGELLAN

## (undated) DEVICE — PACK-MAJOR

## (undated) DEVICE — GLOVE SURG SZ 75 L12IN THK75MIL DK GRN LTX FREE

## (undated) DEVICE — SYRINGE MED 10ML LUERLOCK TIP W/O SFTY DISP

## (undated) DEVICE — SUTURE FIBERWIRE SZ 5 L38IN NONABSORBABLE BLU L48MM 1/2 AR7211

## (undated) DEVICE — ADHESIVE SKIN CLSR 0.7ML TOP DERMBND ADV

## (undated) DEVICE — C-ARM: Brand: UNBRANDED

## (undated) DEVICE — PACK PROCEDURE SURG ORTH BASIC SRHP LF

## (undated) DEVICE — SUTURE STRATAFIX SZ 3-0 30CM NONABSORB UD 26MM FS 3/8 SXMP2B412

## (undated) DEVICE — GOWN,SIRUS,NONRNF,SETINSLV,XL,20/CS: Brand: MEDLINE

## (undated) DEVICE — GLOVE SURG SZ 9 THK91MIL LTX FREE SYN POLYISOPRENE ANTI

## (undated) DEVICE — ROYAL SILK SURGICAL GOWN, XXL: Brand: CONVERTORS

## (undated) DEVICE — 3M™ IOBAN™ 2 ANTIMICROBIAL INCISE DRAPE 6650EZ: Brand: IOBAN™ 2

## (undated) DEVICE — 2.5MM PROVISIONAL FIXATION PIN - 25MM: Brand: EVOS

## (undated) DEVICE — BASIC SINGLE BASIN BTC-LF: Brand: MEDLINE INDUSTRIES, INC.

## (undated) DEVICE — BANDAGE COMPR E SGL LAYERED CLSR BGE W/ CLP W4INXL15FT

## (undated) DEVICE — PENCIL SMK EVAC ALL IN 1 DSGN ENH VISIBILITY IMPROVED AIR

## (undated) DEVICE — SPONGE LAP W18XL18IN WHT COT 4 PLY FLD STRUNG RADPQ DISP ST

## (undated) DEVICE — SUTURE VCRL + SZ 2-0 L27IN ABSRB UD CP-1 1/2 CIR REV CUT VCP266H

## (undated) DEVICE — GLOVE ORANGE PI 7 1/2   MSG9075

## (undated) DEVICE — SPONGE GZ W4XL4IN COT 12 PLY TYP VII WVN C FLD DSGN STERILE

## (undated) DEVICE — SUTURE VCRL + 1 L27IN ABSRB UD CT-1 L36MM 1/2 CIR TAPR PNT VCP261H

## (undated) DEVICE — 450 ML BOTTLE OF 0.05% CHLORHEXIDINE GLUCONATE IN 99.95% STERILE WATER FOR IRRIGATION, USP AND APPLICATOR.: Brand: IRRISEPT ANTIMICROBIAL WOUND LAVAGE

## (undated) DEVICE — GLOVE ORANGE PI 8 1/2   MSG9085

## (undated) DEVICE — APPLICATOR MEDICATED 26 CC SOLUTION HI LT ORNG CHLORAPREP

## (undated) DEVICE — DRESSING,GAUZE,XEROFORM,CURAD,5"X9",ST: Brand: CURAD

## (undated) DEVICE — GAUZE,SPONGE,8"X4",12PLY,XRAY,STRL,LF: Brand: MEDLINE

## (undated) DEVICE — BAG,BANDED,W/RUBBERBAND,STERILE,30X36: Brand: MEDLINE

## (undated) DEVICE — EVOS SMALL 2.0MM DRILL W/AO QC LONG: Brand: EVOS

## (undated) DEVICE — SUTURE VCRL + SZ 2-0 L27IN ABSRB CLR CT-1 1/2 CIR TAPERCUT VCP259H

## (undated) DEVICE — DRAPE,U/SHT,SPLIT,FILM,60X84,STERILE: Brand: MEDLINE

## (undated) DEVICE — DRAIN SURG L18IN DIA025IN 100% SIL RADPQ FOR CLS WND DRNAGE